# Patient Record
Sex: FEMALE | Race: WHITE
[De-identification: names, ages, dates, MRNs, and addresses within clinical notes are randomized per-mention and may not be internally consistent; named-entity substitution may affect disease eponyms.]

---

## 2017-07-18 ENCOUNTER — HOSPITAL ENCOUNTER (EMERGENCY)
Dept: HOSPITAL 17 - PHED | Age: 32
Discharge: HOME | End: 2017-07-18
Payer: COMMERCIAL

## 2017-07-18 VITALS — BODY MASS INDEX: 22.8 KG/M2 | WEIGHT: 123.9 LBS | HEIGHT: 62 IN

## 2017-07-18 VITALS
OXYGEN SATURATION: 100 % | DIASTOLIC BLOOD PRESSURE: 74 MMHG | RESPIRATION RATE: 16 BRPM | SYSTOLIC BLOOD PRESSURE: 120 MMHG | HEART RATE: 79 BPM

## 2017-07-18 VITALS
HEART RATE: 79 BPM | RESPIRATION RATE: 18 BRPM | DIASTOLIC BLOOD PRESSURE: 73 MMHG | SYSTOLIC BLOOD PRESSURE: 115 MMHG | OXYGEN SATURATION: 100 %

## 2017-07-18 VITALS
TEMPERATURE: 98.5 F | HEART RATE: 97 BPM | OXYGEN SATURATION: 100 % | DIASTOLIC BLOOD PRESSURE: 70 MMHG | RESPIRATION RATE: 18 BRPM | SYSTOLIC BLOOD PRESSURE: 124 MMHG

## 2017-07-18 DIAGNOSIS — O21.0: Primary | ICD-10-CM

## 2017-07-18 DIAGNOSIS — B96.89: ICD-10-CM

## 2017-07-18 DIAGNOSIS — Z3A.10: ICD-10-CM

## 2017-07-18 LAB
ALP SERPL-CCNC: 46 U/L (ref 45–117)
ALT SERPL-CCNC: 16 U/L (ref 10–53)
ANION GAP SERPL CALC-SCNC: 8 MEQ/L (ref 5–15)
AST SERPL-CCNC: 10 U/L (ref 15–37)
BACTERIA #/AREA URNS HPF: (no result) /HPF
BASOPHILS # BLD AUTO: 0 TH/MM3 (ref 0–0.2)
BASOPHILS NFR BLD: 0.5 % (ref 0–2)
BILIRUB SERPL-MCNC: 0.5 MG/DL (ref 0.2–1)
BUN SERPL-MCNC: 4 MG/DL (ref 7–18)
CHLORIDE SERPL-SCNC: 105 MEQ/L (ref 98–107)
COLOR UR: YELLOW
COMMENT (UR): (no result)
CULTURE IF INDICATED: (no result)
EOSINOPHIL # BLD: 0 TH/MM3 (ref 0–0.4)
EOSINOPHIL NFR BLD: 0.3 % (ref 0–4)
ERYTHROCYTE [DISTWIDTH] IN BLOOD BY AUTOMATED COUNT: 12 % (ref 11.6–17.2)
GFR SERPLBLD BASED ON 1.73 SQ M-ARVRAT: 173 ML/MIN (ref 89–?)
GLUCOSE UR STRIP-MCNC: (no result) MG/DL
HCO3 BLD-SCNC: 24.8 MEQ/L (ref 21–32)
HCT VFR BLD CALC: 38.6 % (ref 35–46)
HEMO FLAGS: (no result)
HGB UR QL STRIP: (no result)
KETONES UR STRIP-MCNC: (no result) MG/DL
LEUKOCYTE ESTERASE UR QL STRIP: (no result) /HPF (ref 0–5)
LYMPHOCYTES # BLD AUTO: 1.7 TH/MM3 (ref 1–4.8)
LYMPHOCYTES NFR BLD AUTO: 21.1 % (ref 9–44)
MCH RBC QN AUTO: 31.6 PG (ref 27–34)
MCHC RBC AUTO-ENTMCNC: 34.4 % (ref 32–36)
MCV RBC AUTO: 91.7 FL (ref 80–100)
MONOCYTES NFR BLD: 5.8 % (ref 0–8)
MUCOUS THREADS #/AREA URNS LPF: (no result) /LPF
NEUTROPHILS # BLD AUTO: 5.8 TH/MM3 (ref 1.8–7.7)
NEUTROPHILS NFR BLD AUTO: 72.3 % (ref 16–70)
NITRITE UR QL STRIP: (no result)
PLATELET # BLD: 306 TH/MM3 (ref 150–450)
POTASSIUM SERPL-SCNC: 3.9 MEQ/L (ref 3.5–5.1)
RBC # BLD AUTO: 4.21 MIL/MM3 (ref 4–5.3)
SODIUM SERPL-SCNC: 138 MEQ/L (ref 136–145)
SP GR UR STRIP: 1.02 (ref 1–1.03)
SQUAMOUS #/AREA URNS HPF: (no result) /HPF (ref 0–5)
WBC # BLD AUTO: 8 TH/MM3 (ref 4–11)

## 2017-07-18 PROCEDURE — 96365 THER/PROPH/DIAG IV INF INIT: CPT

## 2017-07-18 PROCEDURE — 80053 COMPREHEN METABOLIC PANEL: CPT

## 2017-07-18 PROCEDURE — 99284 EMERGENCY DEPT VISIT MOD MDM: CPT

## 2017-07-18 PROCEDURE — 81001 URINALYSIS AUTO W/SCOPE: CPT

## 2017-07-18 PROCEDURE — 87086 URINE CULTURE/COLONY COUNT: CPT

## 2017-07-18 PROCEDURE — 85025 COMPLETE CBC W/AUTO DIFF WBC: CPT

## 2017-07-18 PROCEDURE — 96361 HYDRATE IV INFUSION ADD-ON: CPT

## 2017-07-18 PROCEDURE — 96375 TX/PRO/DX INJ NEW DRUG ADDON: CPT

## 2017-07-18 NOTE — PD
HPI


Chief Complaint:  GI Complaint


Time Seen by Provider:  20:10


Travel History


International Travel<30 days:  No


Contact w/Intl Traveler<30days:  No


Traveled to known affect area:  No





History of Present Illness


HPI


Is a 32 year-old woman presents to the emergency department at about 10 weeks 

pregnant complains of nausea vomiting.  She states she's had nausea and 

vomiting for the past 2 weeks or so.  She hasn't really been able to eat or 

drink anything past several days.  This is her second pregnancy.  With her 

first pregnancy she had a lot of trouble with nausea and vomiting as well.  She 

was on Zofran and then.  She sees Dr. Little.  She wanted to hold off until 

the second trimester to use Zofran.  She denies any abdominal cramping bleeding 

or discharge.  She has had a little bit of sore throat and irritation, chest 

burning, and some epistaxis.





History


Past Medical History


Medical History:  Denies Significant Hx


Tetanus Vaccination:  > 5 Years


Influenza Vaccination:  No


LMP:  17


:  2


Para:  1





Past Surgical History


Surgical History:  No Previous Surgery





Social History


Alcohol Use:  No


Tobacco Use:  No





Allergies-Medications


(Allergen,Severity, Reaction):  


Coded Allergies:  


     Penicillin (Verified  Adverse Reaction, Mild, unknown, 17)


Uncoded Allergies:  


     CILLINS (Allergy, Severe, 10/1/15)


Reported Meds & Prescriptions





Reported Meds & Active Scripts


Active


No Active Prescriptions or Reported Medications    








Review of Systems


Except as stated in HPI:  all other systems reviewed are Neg





Physical Exam


Narrative


GENERAL: 32 year-old woman, generally well-appearing, no acute distress.


SKIN: Focused skin assessment warm/dry.


HEAD: Atraumatic. Normocephalic. 


CARDIOVASCULAR: Regular rate and rhythm.  No murmur appreciated.


RESPIRATORY: No accessory muscle use. Clear to auscultation. Breath sounds 

equal bilaterally. 


GASTROINTESTINAL: Abdomen soft, non-tender, nondistended. Hepatic and splenic 

margins not palpable. 


MUSCULOSKELETAL: No obvious deformities.  No edema.


NEUROLOGICAL: Awake and alert. No obvious cranial nerve deficits.  Motor 

grossly within normal limits. Normal speech.


PSYCHIATRIC: Appropriate mood and affect; insight and judgment normal.





Data


Data


Last Documented VS





Vital Signs








  Date Time  Temp Pulse Resp B/P Pulse Ox O2 Delivery O2 Flow Rate FiO2


 


17 21:34  79 16 120/74 100 Room Air  


 


17 19:51 98.5       








Orders





 Complete Blood Count With Diff (17 20:39)


Comprehensive Metabolic Panel (17 20:39)


Urinalysis - C+S If Indicated (17 20:39)


Iv Access Insert/Monitor (17 20:39)


Ed Poc Ultrasound (17 )


Sodium Chlor 0.9% 1000 Ml Inj (Ns 1000 M (17 20:45)


Sodium Chlor 0.9% 1000 Ml Inj (Ns 1000 M (17 20:45)


Thiamine Inj (Thiamine Inj) (17 20:45)


Ondansetron Inj (Zofran Inj) (17 20:45)


Urine Culture (17 21:25)





Labs








 Laboratory Tests








Test 17





 21:15 21:25


 


White Blood Count 8.0 TH/MM3 


 


Red Blood Count 4.21 MIL/MM3 


 


Hemoglobin 13.3 GM/DL 


 


Hematocrit 38.6 % 


 


Mean Corpuscular Volume 91.7 FL 


 


Mean Corpuscular Hemoglobin 31.6 PG 


 


Mean Corpuscular Hemoglobin 34.4 % 





Concent  


 


Red Cell Distribution Width 12.0 % 


 


Platelet Count 306 TH/MM3 


 


Mean Platelet Volume 8.6 FL 


 


Neutrophils (%) (Auto) 72.3 % 


 


Lymphocytes (%) (Auto) 21.1 % 


 


Monocytes (%) (Auto) 5.8 % 


 


Eosinophils (%) (Auto) 0.3 % 


 


Basophils (%) (Auto) 0.5 % 


 


Neutrophils # (Auto) 5.8 TH/MM3 


 


Lymphocytes # (Auto) 1.7 TH/MM3 


 


Monocytes # (Auto) 0.5 TH/MM3 


 


Eosinophils # (Auto) 0.0 TH/MM3 


 


Basophils # (Auto) 0.0 TH/MM3 


 


CBC Comment DIFF FINAL  


 


Differential Comment   


 


Sodium Level 138 MEQ/L 


 


Potassium Level 3.9 MEQ/L 


 


Chloride Level 105 MEQ/L 


 


Carbon Dioxide Level 24.8 MEQ/L 


 


Anion Gap 8 MEQ/L 


 


Blood Urea Nitrogen 4 MG/DL 


 


Creatinine 0.42 MG/DL 


 


Estimat Glomerular Filtration 173 ML/MIN 





Rate  


 


Random Glucose 80 MG/DL 


 


Calcium Level 8.5 MG/DL 


 


Total Bilirubin 0.5 MG/DL 


 


Aspartate Amino Transf 10 U/L 





(AST/SGOT)  


 


Alanine Aminotransferase 16 U/L 





(ALT/SGPT)  


 


Alkaline Phosphatase 46 U/L 


 


Total Protein 7.2 GM/DL 


 


Albumin 3.6 GM/DL 


 


Urine Color  YELLOW 


 


Urine Turbidity  SLIGHT 


 


Urine pH  6.0 


 


Urine Specific Gravity  1.018 


 


Urine Protein  NEG mg/dL


 


Urine Glucose (UA)  NEG mg/dL


 


Urine Ketones  80 OR GREATER





  mg/dL


 


Urine Occult Blood  NEG 


 


Urine Nitrite  NEG 


 


Urine Bilirubin  NEG 


 


Urine Leukocyte Esterase  NEG 


 


Urine WBC  0-2 /hpf


 


Urine Squamous Epithelial  0-5 /hpf





Cells  


 


Urine Bacteria  MOD /hpf


 


Urine Mucus  MOD /lpf


 


Urine Yeast (Budding)  RARE 


 


Microscopic Urinalysis Comment  CULTURE





  INDICATED














MDM


Medical Decision Making


Medical Screen Exam Complete:  Yes


Emergency Medical Condition:  Yes


Differential Diagnosis


Nausea vomiting of pregnancy, dehydration, vitamin deficiency, other


Narrative Course


Medical decision making





32-year-old with hyperemesis gravidarum.  She looks well.  We'll give IV fluid 

hydration, check labs, check urine, give Zofran, give thiamine.  Reassess.





Procedures


**Procedure Narrative**


Point of care ultrasound:


Focus transabdominal ultrasounds performed by me at the bedside for the purpose 

of evaluating for fetal well-being.  Abdi acute brace was identified, 

roughly consistent with dates, good fetal movement, good fetal heart rate.





Diagnosis





 Primary Impression:  


 Hyperemesis gravidarum


Patient Instructions:  General Instructions





***Additional Instructions:


Use Zofran as prescribed.





Take ranitidine as prescribed.





Follow up with Dr. Caban in the next 2-4 days.





Return to the ER for any worsening abdominal pain, vomiting, bleeding, or any 

other new or worsening symptoms.


***Med/Other Pt SpecificInfo:  Prescription(s) given


Scripts


Ranitidine 150 Mg Uvs198 Mg PO BID  #60 CAP


   Prov:Enrique Caldera MD         17 


Ondansetron Odt (Zofran Odt)4 Mg Tab4 Mg SL Q8HR PRN (Nausea/Vomiting) #15 TAB


   May substitute non-ODT form.


   Prov:Enrique Caldera MD         17


Disposition:  01 DISCHARGE HOME


Condition:  Stable








Enrique Caldera MD 2017 21:33

## 2017-07-25 ENCOUNTER — HOSPITAL ENCOUNTER (OUTPATIENT)
Dept: HOSPITAL 17 - H2EB | Age: 32
Setting detail: OBSERVATION
LOS: 1 days | Discharge: HOME | End: 2017-07-26
Attending: OBSTETRICS & GYNECOLOGY | Admitting: OBSTETRICS & GYNECOLOGY
Payer: COMMERCIAL

## 2017-07-25 VITALS — DIASTOLIC BLOOD PRESSURE: 53 MMHG | SYSTOLIC BLOOD PRESSURE: 99 MMHG | HEART RATE: 82 BPM

## 2017-07-25 VITALS — SYSTOLIC BLOOD PRESSURE: 104 MMHG | DIASTOLIC BLOOD PRESSURE: 56 MMHG | HEART RATE: 73 BPM

## 2017-07-25 VITALS — BODY MASS INDEX: 22.72 KG/M2 | HEIGHT: 62 IN | WEIGHT: 123.46 LBS

## 2017-07-25 VITALS — HEART RATE: 72 BPM

## 2017-07-25 VITALS — RESPIRATION RATE: 17 BRPM

## 2017-07-25 VITALS — HEART RATE: 80 BPM

## 2017-07-25 VITALS — HEART RATE: 76 BPM

## 2017-07-25 VITALS — RESPIRATION RATE: 16 BRPM

## 2017-07-25 VITALS — TEMPERATURE: 98.1 F

## 2017-07-25 DIAGNOSIS — O21.1: Primary | ICD-10-CM

## 2017-07-25 DIAGNOSIS — Z3A.11: ICD-10-CM

## 2017-07-25 DIAGNOSIS — R82.4: ICD-10-CM

## 2017-07-25 DIAGNOSIS — R63.4: ICD-10-CM

## 2017-07-25 DIAGNOSIS — R55: ICD-10-CM

## 2017-07-25 LAB
ALP SERPL-CCNC: 43 U/L (ref 45–117)
ALT SERPL-CCNC: 17 U/L (ref 10–53)
ANION GAP SERPL CALC-SCNC: 8 MEQ/L (ref 5–15)
AST SERPL-CCNC: 7 U/L (ref 15–37)
B-OH-BUTYR SERPL-SCNC: 0.2 MMOL/L (ref 0–0.39)
BASOPHILS # BLD AUTO: 0 TH/MM3 (ref 0–0.2)
BASOPHILS NFR BLD: 0.3 % (ref 0–2)
BILIRUB SERPL-MCNC: 0.5 MG/DL (ref 0.2–1)
BUN SERPL-MCNC: 5 MG/DL (ref 7–18)
CHLORIDE SERPL-SCNC: 103 MEQ/L (ref 98–107)
EOSINOPHIL # BLD: 0 TH/MM3 (ref 0–0.4)
EOSINOPHIL NFR BLD: 0 % (ref 0–4)
ERYTHROCYTE [DISTWIDTH] IN BLOOD BY AUTOMATED COUNT: 12.6 % (ref 11.6–17.2)
GFR SERPLBLD BASED ON 1.73 SQ M-ARVRAT: 148 ML/MIN (ref 89–?)
HCO3 BLD-SCNC: 24.9 MEQ/L (ref 21–32)
HCT VFR BLD CALC: 36.8 % (ref 35–46)
HEMO FLAGS: (no result)
LYMPHOCYTES # BLD AUTO: 1.1 TH/MM3 (ref 1–4.8)
LYMPHOCYTES NFR BLD AUTO: 13.1 % (ref 9–44)
MCH RBC QN AUTO: 32.1 PG (ref 27–34)
MCHC RBC AUTO-ENTMCNC: 35.6 % (ref 32–36)
MCV RBC AUTO: 90.1 FL (ref 80–100)
MONOCYTES NFR BLD: 4.2 % (ref 0–8)
NEUTROPHILS # BLD AUTO: 6.9 TH/MM3 (ref 1.8–7.7)
NEUTROPHILS NFR BLD AUTO: 82.4 % (ref 16–70)
PLATELET # BLD: 279 TH/MM3 (ref 150–450)
POTASSIUM SERPL-SCNC: 3.6 MEQ/L (ref 3.5–5.1)
RBC # BLD AUTO: 4.09 MIL/MM3 (ref 4–5.3)
RUBELLA STATUS: (no result)
RUBV IGG SER-ACNC: 25.2 IU/ML (ref 10–500)
SODIUM SERPL-SCNC: 136 MEQ/L (ref 136–145)
URATE SERPL-MCNC: 3.2 MG/DL (ref 2.6–6)
WBC # BLD AUTO: 8.4 TH/MM3 (ref 4–11)

## 2017-07-25 PROCEDURE — 82010 KETONE BODYS QUAN: CPT

## 2017-07-25 PROCEDURE — G0378 HOSPITAL OBSERVATION PER HR: HCPCS

## 2017-07-25 PROCEDURE — 84550 ASSAY OF BLOOD/URIC ACID: CPT

## 2017-07-25 PROCEDURE — 85025 COMPLETE CBC W/AUTO DIFF WBC: CPT

## 2017-07-25 PROCEDURE — 86901 BLOOD TYPING SEROLOGIC RH(D): CPT

## 2017-07-25 PROCEDURE — 80053 COMPREHEN METABOLIC PANEL: CPT

## 2017-07-25 PROCEDURE — 84439 ASSAY OF FREE THYROXINE: CPT

## 2017-07-25 PROCEDURE — 86376 MICROSOMAL ANTIBODY EACH: CPT

## 2017-07-25 PROCEDURE — 86762 RUBELLA ANTIBODY: CPT

## 2017-07-25 PROCEDURE — 86800 THYROGLOBULIN ANTIBODY: CPT

## 2017-07-25 PROCEDURE — 86900 BLOOD TYPING SEROLOGIC ABO: CPT

## 2017-07-25 PROCEDURE — 84481 FREE ASSAY (FT-3): CPT

## 2017-07-25 PROCEDURE — 86592 SYPHILIS TEST NON-TREP QUAL: CPT

## 2017-07-25 PROCEDURE — 84443 ASSAY THYROID STIM HORMONE: CPT

## 2017-07-25 PROCEDURE — 86703 HIV-1/HIV-2 1 RESULT ANTBDY: CPT

## 2017-07-25 PROCEDURE — 86850 RBC ANTIBODY SCREEN: CPT

## 2017-07-25 RX ADMIN — SODIUM CHLORIDE SCH MG: 900 INJECTION, SOLUTION INTRAVENOUS at 13:55

## 2017-07-25 RX ADMIN — OXYTOCIN SCH MLS/HR: 10 INJECTION, SOLUTION INTRAMUSCULAR; INTRAVENOUS at 13:55

## 2017-07-25 RX ADMIN — FAMOTIDINE SCH MG: 10 INJECTION, SOLUTION INTRAVENOUS at 15:29

## 2017-07-25 RX ADMIN — FAMOTIDINE SCH MG: 10 INJECTION, SOLUTION INTRAVENOUS at 20:31

## 2017-07-25 RX ADMIN — OXYTOCIN SCH MLS/HR: 10 INJECTION, SOLUTION INTRAMUSCULAR; INTRAVENOUS at 18:20

## 2017-07-25 NOTE — MH
cc:

NORMAN AGUILERA

****

 

DATE OF ADMISSION:  2017

 

ADMISSION DIAGNOSIS

Hyperemesis gravidarum with severe ketonuria,  10 pound weight loss and

multiple persistent episodes of syncope including the one in my office today.

She is currently at 11 weeks estimated gestational age.

 

HISTORY OF PRESENT ILLNESS

The patient is a very pleasant 32-year-old  white female  2, para

1-0-0-1 with LMP  and EDC  by initial  dates but  2018 by

6-week sonogram, is currently 11 and 07 weeks. This  is the second visit in the

office.  At the first visit at six weeks we deferred her exam due to severe

nausea and vomiting.  She was given diclegis and then  she was given Zofran. She

went to the emergency room two days ago at [place] [facility] for syncopal episodes,

dehydration. She was hydrated and given additional antiemetics and sent home.

She came to our office today  to do her new OB and literally passed out in the

office. She had severe ketones. She has lost 10 pounds since her visit 

and she cannot hold down any fluids or any solids. She had severe nausea with

her first child but not to this extent. That was a term delivery at 7 pounds 2

ounces  Other than this hyperemesis gravidarum, she has no chronic or

systemic illnesses.  She does not smoke or drink.  She is not using any alcohol

during pregnancy.  She has had no thyromegaly, hepatitis or other issues that

we are aware.

 

Family history is noncontributory.   She works at the [employer]

and her  works for [employer].

 

PHYSICAL EXAMINATION

GENERAL:  She is a very pale, thin white female with poor skin turgor and

tachycardia.

LUNGS: Clear.

HEART: Regular at 120.

We did hear good fetal heart tones.  She has poor capillary refill and

diminished pulses but normal reflexes.

 

IMPRESSION

Hyperemesis gravidarum in late first trimester in a multip who has had problems

with nausea and vomiting in the past but not to this extent.

 

PLAN

Admit for IV hydration and aggressive intervention to allow her to obtain

nutrition and do well.

 

 

                               __________________________________

                           Norman P. MD GABBIE Aguilera/SA

D:  2017/5:33 PM

T:  2017/6:13 PM

Visit #:  [id number]

Job #:  [id number]

[email or web address]

## 2017-07-25 NOTE — PD.OB.ANTE
Subjective


Diagnosis:  


(1) Hyperemesis gravidarum


Interval History


H & P dictated


33 yo mwf  at 11 weeks with 10 pounds weight loss in 3 weeks, severe 

ketonuria, Inability to tolerate any po and multiple syncopal episodes 

including in my office today.


Sent over for IV hydration and supportive care.





Objective


Vital Signs





Vital Signs








  Date Time  Temp Pulse Resp B/P Pulse Ox O2 Delivery O2 Flow Rate FiO2


 


17 17:56   17     


 


17 13:39   17     


 


17 13:37  73  104/56    


 


17 13:35  72      


 


17 13:30  76      








Lab & Micro Results











Test 17





 13:07 13:08


 


White Blood Count 8.4 TH/MM3 


 


Red Blood Count 4.09 MIL/MM3 


 


Hemoglobin 13.1 GM/DL 


 


Hematocrit 36.8 % 


 


Mean Corpuscular Volume 90.1 FL 


 


Mean Corpuscular Hemoglobin 32.1 PG 


 


Mean Corpuscular Hemoglobin 35.6 % 





Concent  


 


Red Cell Distribution Width 12.6 % 


 


Platelet Count 279 TH/MM3 


 


Mean Platelet Volume 8.8 FL 


 


Neutrophils (%) (Auto) 82.4 % 


 


Lymphocytes (%) (Auto) 13.1 % 


 


Monocytes (%) (Auto) 4.2 % 


 


Eosinophils (%) (Auto) 0.0 % 


 


Basophils (%) (Auto) 0.3 % 


 


Neutrophils # (Auto) 6.9 TH/MM3 


 


Lymphocytes # (Auto) 1.1 TH/MM3 


 


Monocytes # (Auto) 0.4 TH/MM3 


 


Eosinophils # (Auto) 0.0 TH/MM3 


 


Basophils # (Auto) 0.0 TH/MM3 


 


CBC Comment DIFF FINAL  


 


Differential Comment   


 


Sodium Level 136 MEQ/L 


 


Potassium Level 3.6 MEQ/L 


 


Chloride Level 103 MEQ/L 


 


Carbon Dioxide Level 24.9 MEQ/L 


 


Anion Gap 8 MEQ/L 


 


Blood Urea Nitrogen 5 MG/DL 


 


Creatinine 0.48 MG/DL 


 


Estimat Glomerular Filtration 148 ML/MIN 





Rate  


 


Random Glucose 78 MG/DL 


 


Uric Acid 3.2 MG/DL 


 


Calcium Level 8.9 MG/DL 


 


Total Bilirubin 0.5 MG/DL 


 


Aspartate Amino Transf 7 U/L 





(AST/SGOT)  


 


Alanine Aminotransferase 17 U/L 





(ALT/SGPT)  


 


Alkaline Phosphatase 43 U/L 


 


Total Protein 7.3 GM/DL 


 


Albumin 3.7 GM/DL 


 


Thyroid Stimulating Hormone 0.331 uIU/ML 





3rd Gen  


 


B-Hydroxybutyrate 0.20 MMOL/L 


 


Rubella Immunity Screen IMMUNE  


 


Rubella Antibody, Quantitative 25.2 IU/mL 


 


Blood Type  O POSITIVE 


 


Antibody Screen  NEGATIVE 








Physical Exam


GENERAL: Well-nourished, well-developed patient.  initially poor skin turgor.  


CARDIOVASCULAR: Regular rate and rhythm without murmurs, gallops, or rubs.  

tachycardia observed in office resolved


RESPIRATORY: Breath sounds equal bilaterally. No accessory muscle use.


ABDOMEN/GI: Abdomen soft, non-tender.


fetal heart tones heard in office. 





EXTREMITIES: No cyanosis or edema, non-tender, without signs of DVT.





Assessment and Plan


Assessment and Plan


hydrate


reglan, zantac and phenergan


advance diet as tolerated.








Jemima Little MD 2017 18:14

## 2017-07-26 VITALS — RESPIRATION RATE: 18 BRPM | HEART RATE: 76 BPM | SYSTOLIC BLOOD PRESSURE: 100 MMHG | DIASTOLIC BLOOD PRESSURE: 57 MMHG

## 2017-07-26 VITALS — DIASTOLIC BLOOD PRESSURE: 43 MMHG | HEART RATE: 75 BPM | SYSTOLIC BLOOD PRESSURE: 82 MMHG

## 2017-07-26 VITALS
RESPIRATION RATE: 18 BRPM | TEMPERATURE: 98.4 F | SYSTOLIC BLOOD PRESSURE: 101 MMHG | DIASTOLIC BLOOD PRESSURE: 58 MMHG | HEART RATE: 87 BPM

## 2017-07-26 VITALS — SYSTOLIC BLOOD PRESSURE: 85 MMHG | DIASTOLIC BLOOD PRESSURE: 54 MMHG | HEART RATE: 77 BPM

## 2017-07-26 VITALS — RESPIRATION RATE: 16 BRPM | DIASTOLIC BLOOD PRESSURE: 57 MMHG | HEART RATE: 78 BPM | SYSTOLIC BLOOD PRESSURE: 110 MMHG

## 2017-07-26 VITALS — DIASTOLIC BLOOD PRESSURE: 60 MMHG | SYSTOLIC BLOOD PRESSURE: 102 MMHG | HEART RATE: 76 BPM

## 2017-07-26 VITALS
SYSTOLIC BLOOD PRESSURE: 85 MMHG | RESPIRATION RATE: 16 BRPM | HEART RATE: 77 BPM | TEMPERATURE: 98.4 F | DIASTOLIC BLOOD PRESSURE: 54 MMHG

## 2017-07-26 VITALS — SYSTOLIC BLOOD PRESSURE: 105 MMHG | DIASTOLIC BLOOD PRESSURE: 59 MMHG | HEART RATE: 80 BPM

## 2017-07-26 VITALS — HEART RATE: 76 BPM | DIASTOLIC BLOOD PRESSURE: 50 MMHG | SYSTOLIC BLOOD PRESSURE: 81 MMHG

## 2017-07-26 VITALS — TEMPERATURE: 98.3 F

## 2017-07-26 VITALS — TEMPERATURE: 97.9 F

## 2017-07-26 LAB
RAPID PLASMA REAGIN SCREEN: (no result)
T3FREE SERPL-MCNC: 2.3 PG/ML (ref 2.18–3.98)
T4 FREE SERPL-MCNC: 1.2 NG/DL (ref 0.76–1.46)

## 2017-07-26 RX ADMIN — SODIUM CHLORIDE SCH MG: 900 INJECTION, SOLUTION INTRAVENOUS at 00:25

## 2017-07-26 RX ADMIN — SODIUM CHLORIDE SCH MG: 900 INJECTION, SOLUTION INTRAVENOUS at 06:00

## 2017-07-26 RX ADMIN — OXYTOCIN SCH MLS/HR: 10 INJECTION, SOLUTION INTRAMUSCULAR; INTRAVENOUS at 07:09

## 2017-07-26 RX ADMIN — OXYTOCIN SCH MLS/HR: 10 INJECTION, SOLUTION INTRAMUSCULAR; INTRAVENOUS at 00:26

## 2017-07-26 RX ADMIN — PROMETHAZINE HYDROCHLORIDE SCH MG: 25 TABLET ORAL at 14:17

## 2017-07-26 RX ADMIN — OXYTOCIN SCH MLS/HR: 10 INJECTION, SOLUTION INTRAMUSCULAR; INTRAVENOUS at 13:17

## 2017-07-26 RX ADMIN — PROMETHAZINE HYDROCHLORIDE SCH MG: 25 TABLET ORAL at 06:00

## 2017-07-26 RX ADMIN — OXYTOCIN SCH MLS/HR: 10 INJECTION, SOLUTION INTRAMUSCULAR; INTRAVENOUS at 06:30

## 2017-07-26 RX ADMIN — FAMOTIDINE SCH MG: 10 INJECTION, SOLUTION INTRAVENOUS at 09:00

## 2017-07-26 RX ADMIN — PROMETHAZINE HYDROCHLORIDE SCH MG: 25 TABLET ORAL at 00:25

## 2017-07-26 NOTE — HHI.DS
Discharge Summary


Admission Date


Jul 25, 2017 at 11:56


Discharge Date:  Jul 26, 2017


Admitting Diagnosis


Hyperemesis gravidarum, [place] pregnancy at 11 weeks





(1) Hyperemesis gravidarum


Diagnosis:  Principal





Procedures


monitoring on labor and delivery, IV fluids and IV medication


Brief History


33 yo at 11 weeks gestation, pt of Dr. Faye admitted after fainting in 

office due to history of severe nausea/vomiting in first trimester, hyperemesis 

gravidarum diagnosed.  Pt was poorly controlled on oral Zofran.


CBC/BMP:  


7/25/17 1307                                                                   

             7/25/17 1307





Significant Findings





Laboratory Tests








Test 7/25/17





 13:07


 


Neutrophils (%) (Auto) 82.4 %





 (16.0-70.0)


 


Blood Urea Nitrogen 5 MG/DL (7-18)


 


Creatinine 0.48 MG/DL





 (0.50-1.00)


 


Aspartate Amino Transf 7 U/L (15-37)





(AST/SGOT) 


 


Alkaline Phosphatase 43 U/L ()


 


Thyroid Stimulating Hormone 0.331 uIU/ML





3rd Gen (0.358-3.740)








PE at Discharge


NAD A&Ox3


CTA bl no wheeze


RRR 


Abd soft nontender nondistended


uterus beneath symphysis c/w dates


no c/c/e x 4


+FHTs 170s


Hospital Course


Pt was admitted 7/25/17 for IV hydration and IV antiemetics to control severe 

hyperemesis symptoms including syncope in office.  Pt did well after 

medications and IV fluids, was transitioned to po anti-emetics and reflux 

medications, tolerated breakfast and lunch and was able to ambulate without any 

dizziness or fatigue.  Discharged to home with office f/u in 1-2 weeks w Dr. Little. Screening TSH had been abnormal but Free T4 was wnl.


Pt Condition on Discharge:  Good


Discharge Disposition:  Discharge Home


Discharge Instructions


DIET: Follow Instructions for:  Pregnancy Diet


Activities you can perform:  See Additionl Instruction (pregnancy precautions)








Nory Posadas MD Jul 26, 2017 13:35

## 2017-07-26 NOTE — PD.OB.ANTE
Subjective


Diagnosis:  


(1) Hyperemesis gravidarum


Diagnosis:  Principal





Interval History


Pt states overnight she has improved as far as nausea symptoms, tolerated 

clears and wants to try regular diet for breakfast.  Denies dizziness, fatigue, 

shortness of breath, VB, LOF, or contractions.  Pain 0/10.


Antepartum ROS:  Reports: Fetal movement normal (too early for fetal movement), 


    Denies: New complaints, Loss of fluid, Vaginal bleeding, Contractions, Other





Objective


Vital Signs





Vital Signs








  Date Time  Temp Pulse Resp B/P Pulse Ox O2 Delivery O2 Flow Rate FiO2


 


7/26/17 08:13  76  102/60    


 


7/26/17 08:12   18     


 


7/26/17 08:12  76  100/57    


 


7/26/17 08:10 98.3       


 


7/26/17 03:39  78  110/57    


 


7/26/17 03:39   16     


 


7/26/17 03:38 97.9       


 


7/26/17 00:35  80  105/59    


 


7/26/17 00:07  77  85/54    


 


7/26/17 00:06  75  82/43    


 


7/26/17 00:05  76  81/50    


 


7/26/17 00:00 98.4       


 


7/26/17 00:00  77 16 85/54    


 


7/25/17 20:45  80      


 


7/25/17 20:30   16     


 


7/25/17 20:25  82      


 


7/25/17 20:25    99/53    


 


7/25/17 20:24 98.1       


 


7/25/17 17:56   17     


 


7/25/17 13:39   17     


 


7/25/17 13:37  73  104/56    


 


7/25/17 13:35  72      


 


7/25/17 13:30  76      








Lab & Micro Results











Test 7/25/17 7/25/17





 13:07 13:08


 


White Blood Count 8.4 TH/MM3 


 


Red Blood Count 4.09 MIL/MM3 


 


Hemoglobin 13.1 GM/DL 


 


Hematocrit 36.8 % 


 


Mean Corpuscular Volume 90.1 FL 


 


Mean Corpuscular Hemoglobin 32.1 PG 


 


Mean Corpuscular Hemoglobin 35.6 % 





Concent  


 


Red Cell Distribution Width 12.6 % 


 


Platelet Count 279 TH/MM3 


 


Mean Platelet Volume 8.8 FL 


 


Neutrophils (%) (Auto) 82.4 % 


 


Lymphocytes (%) (Auto) 13.1 % 


 


Monocytes (%) (Auto) 4.2 % 


 


Eosinophils (%) (Auto) 0.0 % 


 


Basophils (%) (Auto) 0.3 % 


 


Neutrophils # (Auto) 6.9 TH/MM3 


 


Lymphocytes # (Auto) 1.1 TH/MM3 


 


Monocytes # (Auto) 0.4 TH/MM3 


 


Eosinophils # (Auto) 0.0 TH/MM3 


 


Basophils # (Auto) 0.0 TH/MM3 


 


CBC Comment DIFF FINAL  


 


Differential Comment   


 


Sodium Level 136 MEQ/L 


 


Potassium Level 3.6 MEQ/L 


 


Chloride Level 103 MEQ/L 


 


Carbon Dioxide Level 24.9 MEQ/L 


 


Anion Gap 8 MEQ/L 


 


Blood Urea Nitrogen 5 MG/DL 


 


Creatinine 0.48 MG/DL 


 


Estimat Glomerular Filtration 148 ML/MIN 





Rate  


 


Random Glucose 78 MG/DL 


 


Uric Acid 3.2 MG/DL 


 


Calcium Level 8.9 MG/DL 


 


Total Bilirubin 0.5 MG/DL 


 


Aspartate Amino Transf 7 U/L 





(AST/SGOT)  


 


Alanine Aminotransferase 17 U/L 





(ALT/SGPT)  


 


Alkaline Phosphatase 43 U/L 


 


Total Protein 7.3 GM/DL 


 


Albumin 3.7 GM/DL 


 


Thyroid Stimulating Hormone 0.331 uIU/ML 





3rd Gen  


 


B-Hydroxybutyrate 0.20 MMOL/L 


 


Rubella Immunity Screen IMMUNE  


 


Rubella Antibody, Quantitative 25.2 IU/mL 


 


Blood Type  O POSITIVE 


 


Antibody Screen  NEGATIVE 








Physical Exam


GENERAL: Well-nourished, well-developed patient.  Sitting up in bed. 


CARDIOVASCULAR: Regular rate and rhythm without murmurs, gallops, or rubs.


RESPIRATORY: Breath sounds equal bilaterally. No accessory muscle use.


ABDOMEN/GI: Abdomen soft, non-tender.


  Fundus: beneath symphysis c/w dates


GENITOURINARY:


External Genitalia: deferred


FHT's:


  +FCA 170s this AM


EXTREMITIES: No cyanosis or edema, non-tender, without signs of DVT.





Assessment and Plan


Problem List:  


(1) Hyperemesis gravidarum


Status:  Acute


Assessment and Plan


Hyperemesis gravidarum - controlled with combination of Pepcid, Phenergan, 

Reglan IV overnight; will transition to po and see if tolerates lunch today; 

TSH low but need confirmatory Free T4, ordered this AM, will f/u and see if 

needs treatment





dispo: not yet meeting discharge criteria, hopefully later today








Nory Posadas MD Jul 26, 2017 09:20

## 2017-07-28 LAB — THYROGLOB AB SERPL-ACNC: (no result) IU/ML

## 2017-12-26 ENCOUNTER — HOSPITAL ENCOUNTER (OUTPATIENT)
Dept: HOSPITAL 17 - HOBED | Age: 32
Setting detail: OBSERVATION
LOS: 4 days | Discharge: HOME | End: 2017-12-30
Attending: OBSTETRICS & GYNECOLOGY | Admitting: OBSTETRICS & GYNECOLOGY
Payer: COMMERCIAL

## 2017-12-26 VITALS — HEART RATE: 100 BPM | RESPIRATION RATE: 18 BRPM

## 2017-12-26 VITALS — HEART RATE: 108 BPM

## 2017-12-26 VITALS — HEART RATE: 105 BPM

## 2017-12-26 VITALS — HEART RATE: 102 BPM

## 2017-12-26 VITALS — HEART RATE: 101 BPM | DIASTOLIC BLOOD PRESSURE: 67 MMHG | SYSTOLIC BLOOD PRESSURE: 107 MMHG

## 2017-12-26 VITALS — RESPIRATION RATE: 18 BRPM | TEMPERATURE: 99.5 F

## 2017-12-26 VITALS — HEART RATE: 107 BPM

## 2017-12-26 VITALS — HEART RATE: 104 BPM

## 2017-12-26 VITALS — HEART RATE: 103 BPM

## 2017-12-26 VITALS — HEART RATE: 106 BPM

## 2017-12-26 VITALS — HEART RATE: 101 BPM

## 2017-12-26 VITALS — DIASTOLIC BLOOD PRESSURE: 55 MMHG | HEART RATE: 117 BPM | SYSTOLIC BLOOD PRESSURE: 109 MMHG

## 2017-12-26 VITALS — HEART RATE: 110 BPM

## 2017-12-26 VITALS
HEART RATE: 98 BPM | RESPIRATION RATE: 18 BRPM | DIASTOLIC BLOOD PRESSURE: 58 MMHG | SYSTOLIC BLOOD PRESSURE: 103 MMHG | TEMPERATURE: 98.5 F

## 2017-12-26 VITALS — WEIGHT: 152.12 LBS | BODY MASS INDEX: 27.99 KG/M2 | HEIGHT: 62 IN

## 2017-12-26 VITALS — HEART RATE: 97 BPM

## 2017-12-26 VITALS — TEMPERATURE: 99.3 F

## 2017-12-26 VITALS — TEMPERATURE: 98.5 F

## 2017-12-26 VITALS — HEART RATE: 109 BPM

## 2017-12-26 DIAGNOSIS — Z3A.33: ICD-10-CM

## 2017-12-26 DIAGNOSIS — N12: Primary | ICD-10-CM

## 2017-12-26 DIAGNOSIS — O21.0: ICD-10-CM

## 2017-12-26 DIAGNOSIS — B96.20: ICD-10-CM

## 2017-12-26 LAB
ALBUMIN SERPL-MCNC: 2.5 GM/DL (ref 3.4–5)
ALP SERPL-CCNC: 95 U/L (ref 45–117)
ALT SERPL-CCNC: 11 U/L (ref 10–53)
AST SERPL-CCNC: 12 U/L (ref 15–37)
BACTERIA #/AREA URNS HPF: (no result) /HPF
BASOPHILS # BLD AUTO: 0 TH/MM3 (ref 0–0.2)
BASOPHILS NFR BLD: 0.2 % (ref 0–2)
BILIRUB INDIRECT SERPL-MCNC: 0.6 MG/DL (ref 0–0.8)
BILIRUB SERPL-MCNC: 0.7 MG/DL (ref 0.2–1)
BUN SERPL-MCNC: 6 MG/DL (ref 7–18)
CALCIUM SERPL-MCNC: 8.5 MG/DL (ref 8.5–10.1)
CHLORIDE SERPL-SCNC: 104 MEQ/L (ref 98–107)
COLOR UR: YELLOW
CREAT SERPL-MCNC: 0.41 MG/DL (ref 0.5–1)
DIRECT BILIRUBIN ADULT: 0.1 MG/DL (ref 0–0.2)
EOSINOPHIL # BLD: 0 TH/MM3 (ref 0–0.4)
EOSINOPHIL NFR BLD: 0 % (ref 0–4)
ERYTHROCYTE [DISTWIDTH] IN BLOOD BY AUTOMATED COUNT: 12.9 % (ref 11.6–17.2)
GFR SERPLBLD BASED ON 1.73 SQ M-ARVRAT: 178 ML/MIN (ref 89–?)
GLUCOSE SERPL-MCNC: 90 MG/DL (ref 74–106)
GLUCOSE UR STRIP-MCNC: (no result) MG/DL
HCO3 BLD-SCNC: 20.1 MEQ/L (ref 21–32)
HCT VFR BLD CALC: 31.9 % (ref 35–46)
HGB BLD-MCNC: 10.7 GM/DL (ref 11.6–15.3)
HGB UR QL STRIP: (no result)
KETONES UR STRIP-MCNC: 150 MG/DL
LYMPHOCYTES # BLD AUTO: 0.8 TH/MM3 (ref 1–4.8)
LYMPHOCYTES NFR BLD AUTO: 5.8 % (ref 9–44)
MCH RBC QN AUTO: 30.4 PG (ref 27–34)
MCHC RBC AUTO-ENTMCNC: 33.6 % (ref 32–36)
MCV RBC AUTO: 90.5 FL (ref 80–100)
MONOCYTE #: 0.8 TH/MM3 (ref 0–0.9)
MONOCYTES NFR BLD: 6.1 % (ref 0–8)
MUCOUS THREADS #/AREA URNS LPF: (no result) /LPF
NEUTROPHILS # BLD AUTO: 11.4 TH/MM3 (ref 1.8–7.7)
NEUTROPHILS NFR BLD AUTO: 87.9 % (ref 16–70)
NITRITE UR QL STRIP: (no result)
PLATELET # BLD: 266 TH/MM3 (ref 150–450)
PMV BLD AUTO: 8.3 FL (ref 7–11)
PROT SERPL-MCNC: 6.8 GM/DL (ref 6.4–8.2)
RBC # BLD AUTO: 3.52 MIL/MM3 (ref 4–5.3)
SODIUM SERPL-SCNC: 134 MEQ/L (ref 136–145)
SP GR UR STRIP: 1.02 (ref 1–1.03)
SQUAMOUS #/AREA URNS HPF: 10 /HPF (ref 0–5)
URINE LEUKOCYTE ESTERASE: (no result)
WBC # BLD AUTO: 13 TH/MM3 (ref 4–11)
WHITE BLOOD CELL CLUMPS: (no result)

## 2017-12-26 PROCEDURE — 59025 FETAL NON-STRESS TEST: CPT

## 2017-12-26 PROCEDURE — 87086 URINE CULTURE/COLONY COUNT: CPT

## 2017-12-26 PROCEDURE — 80076 HEPATIC FUNCTION PANEL: CPT

## 2017-12-26 PROCEDURE — 99285 EMERGENCY DEPT VISIT HI MDM: CPT

## 2017-12-26 PROCEDURE — 87186 SC STD MICRODIL/AGAR DIL: CPT

## 2017-12-26 PROCEDURE — 80048 BASIC METABOLIC PNL TOTAL CA: CPT

## 2017-12-26 PROCEDURE — 85025 COMPLETE CBC W/AUTO DIFF WBC: CPT

## 2017-12-26 PROCEDURE — 87077 CULTURE AEROBIC IDENTIFY: CPT

## 2017-12-26 PROCEDURE — 87040 BLOOD CULTURE FOR BACTERIA: CPT

## 2017-12-26 PROCEDURE — 76775 US EXAM ABDO BACK WALL LIM: CPT

## 2017-12-26 PROCEDURE — 96366 THER/PROPH/DIAG IV INF ADDON: CPT

## 2017-12-26 PROCEDURE — G0378 HOSPITAL OBSERVATION PER HR: HCPCS

## 2017-12-26 PROCEDURE — 96368 THER/DIAG CONCURRENT INF: CPT

## 2017-12-26 PROCEDURE — 81001 URINALYSIS AUTO W/SCOPE: CPT

## 2017-12-26 PROCEDURE — 96365 THER/PROPH/DIAG IV INF INIT: CPT

## 2017-12-26 RX ADMIN — Medication SCH ML: at 21:00

## 2017-12-26 RX ADMIN — OXYTOCIN SCH MLS/HR: 10 INJECTION, SOLUTION INTRAMUSCULAR; INTRAVENOUS at 14:33

## 2017-12-26 RX ADMIN — CLINDAMYCIN PHOSPHATE SCH MLS/HR: 150 INJECTION, SOLUTION INTRAMUSCULAR; INTRAVENOUS at 14:39

## 2017-12-26 RX ADMIN — CLINDAMYCIN PHOSPHATE SCH MLS/HR: 150 INJECTION, SOLUTION INTRAMUSCULAR; INTRAVENOUS at 22:06

## 2017-12-26 RX ADMIN — CEFTRIAXONE SODIUM SCH MLS/HR: 2 INJECTION, POWDER, FOR SOLUTION INTRAMUSCULAR; INTRAVENOUS at 14:11

## 2017-12-26 NOTE — PD
HPI


Chief Complaint


UTI symptoms, back pain


Date Seen:  Dec 26, 2017


Time Seen:  12:49


Travel History


International Travel<30 Days:  No


Contact w/Intl Traveler<30Days:  No





History of Present Illness


HPI


Patient is a 32 year old  at 33 and 0/7 weeks gestation, OB care with Dr. Little. She presents to the OB ED with continued low back pain and urinary 

frequency. She states she just finished a seven-day course of Macrobid for a UTI

, last dose . She states her symptoms resolved for a few days but recurred 

in the last day or so characterized by increased urinary frequency and severe 

right lower back pain. No dysuria. She denies leakage of fluid, vaginal bleeding

, and contractions. She feels baby moving regularly. She denies HA/N/V/D/fever/

sick contacts/SOB/calf pain/dizziness/seeing spots. She is noted to have 

penicillin allergy, causing rash in the past.





History


Past Medical History


Medical History:  Denies Significant Hx





Obstetric History


Obstetric History


: Full-term vaginal delivery female infant, hyperemesis gravidarum


G2: Current, hyperemesis gravidarum, UTI diagnosed approximately 17, 

treated with Macrobid 7 days 100 mg twice a day


Denies other complications this pregnancy





Past Surgical History


Surgical History:  No Previous Surgery





Family History


Family History:  Negative





Social History


Alcohol Use:  No


Tobacco Use:  No


Substance Abuse:  No





Allergies-Medications


(Allergen,Severity, Reaction):  


Coded Allergies:  


     penicillin G (Unverified  Adverse Reaction, Severe, rash, 8/15/17)


Uncoded Allergies:  


     CILLINS (Allergy, Severe, 10/1/15)


Home Meds


Active Scripts


Metoclopramide (Reglan) 10 Mg Tab, 10 MG PO TIDAC for Reflux, #60 TAB 2 Refills


   Prov:Nory Posadas MD         17


Ranitidine (Zantac) 150 Mg Tab, 150 MG PO BID for Reduce Stomach Acid, #60 TAB 

2 Refills


   Prov:Nory Posadas MD         17


Promethazine (Phenergan) 25 Mg Tablet, 25 MG PO Q8H for Nausea, #90 TAB 2 

Refills


   Prov:Nory Posadas MD         17


Ondansetron Odt (Zofran Odt) 4 Mg Tab, 4 MG SL Q8HR Y for Nausea/Vomiting, #15 

TAB


   May substitute non-ODT form.


   Prov:Enrique Caldera MD         17





Review of Systems


Except as stated in HPI:  all other systems reviewed are Neg





Physical Exam





Vital Signs








  Date Time  Temp Pulse Resp B/P (MAP) Pulse Ox O2 Delivery O2 Flow Rate FiO2


 


17 12:40  104      


 


17 12:35  108      








Narrative


GENERAL: Well-nourished, well-developed female in no apparent distress.


SKIN: Warm and dry. No rashes or ecchymoses.


HEAD: Normocephalic and atraumatic.


EYES: No scleral icterus. No injection or drainage. 


ENT: No nasal drainage noted. Mucous membranes pink. Airway patent.


CARDIOVASCULAR: Regular rate and rhythm without murmurs, gallops, or rubs. 


RESPIRATORY: Breath sounds equal bilaterally. No accessory muscle use.


BACK: Positive CVA tenderness on the right


ABDOMEN/GI: Abdomen soft, non-tender, bowel sounds present, no rebound, no 

guarding. Gravid uterus.


GENITOURINARY: deferred


   External Genitalia: intact and normal in appearance


   Uterine Contractions: some irritability


   Cervix: close, thick, high


FHT's: 


   Category: 1


   Baseline: 150s  


   Reactive: 160s


   Variability: mod 


   Decels: absent


EXTREMITIES: No cyanosis or edema.


BACK: Nontender without obvious deformity. No CVA tenderness.


NEUROLOGICAL: Awake and alert. Motor and sensory grossly within normal limits. 

Five out of 5 muscle strength in all muscle groups. Normal speech.





Data


Data


Vital Signs Reviewed:  Yes (Temp 98.6, /70)


Orders





 Orders


Vital Signs (Adult) .ON ADMISSION (17 12:35)


^ Labor Status (17 12:35)


Urinalysis - C+S If Indicated (17 12:35)


^ Non Stress Test (17 12:35)


Labs





Laboratory Tests








Test


  17


12:30











MDM


Medical Record Reviewed:  Yes


Narrative Course / MDM


Patient is a 32 year old  at 33 and 0/7 weeks gestation, OB care with Dr. Little presenting with possible pyelonephritis. If workup positive, will 

admit to OBS for IV antibiotics and consult Dr. Little.





Pyelonephritis:


Suspected based on history and presenting symptoms


Afebrile


UA ordered


By mouth hydration for now, given history of hyperemesis gravidarum we'll 

transition IV fluids if needed





Intrauterine Pregnancy:


Category 1 tracing


No contractions on monitor to suggest labor


Routine prenatal care


 


Hyperemesis gravidarum:


Takes Zofran when necessary, last dose this morning


IV fluids as necessary


Zofran IV as necessary





WDW Olivia Carr MD R2 Dec 26, 2017 13:04

## 2017-12-26 NOTE — HHI.HP
HPI


Travel History


International Travel<30 Days:  No


Contact w/Intl Traveler<30Days:  No





History of Present Illness


HPI


Patient is a 32 year old  at 33 and 0/7 weeks gestation, OB care with Dr. Little. She presents to the OB ED with continued low back pain and urinary 

frequency. She states she just finished a seven-day course of Macrobid for a UTI

, last dose . She states her symptoms resolved for a few days but recurred 

in the last day or so characterized by increased urinary frequency and severe 

right lower back pain. No dysuria. She denies leakage of fluid, vaginal bleeding

, and contractions. She feels baby moving regularly. She denies HA/N/V/D/fever/

sick contacts/SOB/calf pain/dizziness/seeing spots. She is noted to have 

penicillin allergy, causing rash in the past.





History


Past Medical History


Medical History:  Denies Significant Hx





Obstetric History


: Full-term vaginal delivery female infant, hyperemesis gravidarum


G2: Current, hyperemesis gravidarum, UTI diagnosed approximately 17, 

treated with Macrobid 7 days 100 mg twice a day


Denies other complications this pregnancy





Past Surgical History


Surgical History:  No Previous Surgery





Family History


Family History:  Negative





Social History


Alcohol Use:  No


Tobacco Use:  No


Substance Abuse:  No





Allergies-Medications


(Allergen,Severity, Reaction):  


Coded Allergies:  


     penicillin G (Unverified  Adverse Reaction, Severe, rash, 8/15/17)


Uncoded Allergies:  


     CILLINS (Allergy, Severe, 10/1/15)


Home Meds


Active Scripts


Metoclopramide (Reglan) 10 Mg Tab, 10 MG PO TIDAC for Reflux, #60 TAB 2 Refills


   Prov:Nory Posadas MD         17


Ranitidine (Zantac) 150 Mg Tab, 150 MG PO BID for Reduce Stomach Acid, #60 TAB 

2 Refills


   Prov:Nory Posadas MD         17


Promethazine (Phenergan) 25 Mg Tablet, 25 MG PO Q8H for Nausea, #90 TAB 2 

Refills


   Prov:Nory Posadas MD         17


Ondansetron Odt (Zofran Odt) 4 Mg Tab, 4 MG SL Q8HR Y for Nausea/Vomiting, #15 

TAB


   May substitute non-ODT form.


   Prov:Enrique Caldera MD         17





Review of Systems


Except as stated in HPI:  all other systems reviewed are Neg





Physical Exam





Vital Signs








  Date Time  Temp Pulse Resp B/P (MAP) Pulse Ox O2 Delivery O2 Flow Rate FiO2


 


17 12:40  104      


 


17 12:35  108      








Narrative


GENERAL: Well-nourished, well-developed female in no apparent distress.


SKIN: Warm and dry. No rashes or ecchymoses.


HEAD: Normocephalic and atraumatic.


EYES: No scleral icterus. No injection or drainage. 


ENT: No nasal drainage noted. Mucous membranes pink. Airway patent.


CARDIOVASCULAR: Regular rate and rhythm without murmurs, gallops, or rubs. 


RESPIRATORY: Breath sounds equal bilaterally. No accessory muscle use.


BACK: Positive CVA tenderness on the right


ABDOMEN/GI: Abdomen soft, non-tender, bowel sounds present, no rebound, no 

guarding. Gravid uterus.


GENITOURINARY: deferred


   External Genitalia: intact and normal in appearance


   Uterine Contractions: some irritability


   Cervix: close, thick, high


FHT's: 


   Category: 1


   Baseline: 150s  


   Reactive: 160s


   Variability: mod 


   Decels: absent


EXTREMITIES: No cyanosis or edema.


BACK: Nontender without obvious deformity. No CVA tenderness.


NEUROLOGICAL: Awake and alert. Motor and sensory grossly within normal limits. 

Five out of 5 muscle strength in all muscle groups. Normal speech.





Caprini VTE Risk Assessment


Caprini VTE Risk Assessment:  Mod/High Risk (score >= 2)


Caprini Risk Assessment Model











 Point Value = 1          Point Value = 2  Point Value = 3  Point Value = 5


 


Age 41-60


Minor surgery


BMI > 25 kg/m2


Swollen legs


Varicose veins


Pregnancy or postpartum


History of unexplained or recurrent


   spontaneous 


Oral contraceptives or hormone


   replacement


Sepsis (< 1 month)


Serious lung disease, including


   pneumonia (< 1 month)


Abnormal pulmonary function


Acute myocardial infarction


Congestive heart failure (< 1 month)


History of inflammatory bowel disease


Medical patient at bed rest Age 61-74


Arthroscopic surgery


Major open surgery (> 45 min)


Laparoscopic surgery (> 45 min)


Malignancy


Confined to bed (> 72 hours)


Immobilizing plaster cast


Central venous access Age >= 75


History of VTE


Family history of VTE


Factor V Leiden


Prothrombin 12699Z


Lupus anticoagulant


Anticardiolipin antibodies


Elevated serum homocysteine


Heparin-induced thrombocytopenia


Other congenital or acquired


   thrombophilia Stroke (< 1 month)


Elective arthroplasty


Hip, pelvis, or leg fracture


Acute spinal cord injury (< 1 month)








Prophylaxis Regimen











   Total Risk


Factor Score Risk Level Prophylaxis Regimen


 


0-1      Low Early ambulation


 


2 Moderate Order ONE of the following:


*Sequential Compression Device (SCD)


*Heparin 5000 units SQ BID


 


3-4 Higher Order ONE of the following medications:


*Heparin 5000 units SQ TID


*Enoxaparin/Lovenox 40 mg SQ daily (WT < 150 kg, CrCl > 30 mL/min)


*Enoxaparin/Lovenox 30 mg SQ daily (WT < 150 kg, CrCl > 10-29 mL/min)


*Enoxaparin/Lovenox 30 mg SQ BID (WT < 150 kg, CrCl > 30 mL/min)


AND/OR


*Sequential Compression Device (SCD)


 


5 or more Highest Order ONE of the following medications:


*Heparin 5000 units SQ TID (Preferred with Epidurals)


*Enoxaparin/Lovenox 40 mg SQ daily (WT < 150 kg, CrCl > 30 mL/min)


*Enoxaparin/Lovenox 30 mg SQ daily (WT < 150 kg, CrCl > 10-29 mL/min)


*Enoxaparin/Lovenox 30 mg SQ BID (WT < 150 kg, CrCl > 30 mL/min)


AND


*Sequential Compression Device (SCD)











Data


Data


Vital Signs Reviewed:  Yes (wnl, afebrile)


Orders





 Orders


Vital Signs (Adult) .ON ADMISSION (17 12:35)


^ Labor Status (17 12:35)


Urinalysis - C+S If Indicated (17 12:35)


^ Non Stress Test (17 12:35)


Urine Culture (17 12:30)


Place In Observation (17 )


Diet Regular Basic (17 Lunch)


Vital Signs (Adult) [id number] AWAKE (17 13:31)


Fetal Heart (17 13:31)


Activity Oob Ad Amina (17 13:31)


Complete Blood Count With Diff (17 13:31)


Basic Metabolic Panel (Bmp) (17 13:31)


Hepatic Functional Panel (17 13:31)


Sodium Chloride 0.9% Flush (Ns Flush) (17 21:00)


Sodium Chloride 0.9% Flush (Ns Flush) (17 13:45)


Ondansetron Inj (Zofran Inj) (17 13:45)


Ob (2e) Additional Admit Info (17 13:36)


Labs





Laboratory Tests








Test


  17


12:30


 


Urine Color YELLOW 


 


Urine Turbidity CLOUDY 


 


Urine pH 6.0 


 


Urine Specific Gravity 1.018 


 


Urine Protein 100 


 


Urine Glucose (UA) NEG 


 


Urine Ketones 150 


 


Urine Occult Blood SMALL 


 


Urine Nitrite POS 


 


Urine Bilirubin NEG 


 


Urine Urobilinogen LESS THAN 2.0 


 


Urine Leukocyte Esterase LARGE 


 


Urine RBC 28 


 


Urine WBC  


 


Urine WBC Clumps MOD 


 


Urine Squamous Epithelial


Cells 10 


 


 


Urine Bacteria MANY 


 


Urine Mucus FEW 


 


Microscopic Urinalysis Comment


  CULTURE


INDICATED














 Date/Time


Source Procedure


Growth Status


 


 


 17 12:30


Urine Clean Catch Urine Culture


Pending Received











Assessment/Plan


Assessment and Plan


Patient is a 32 year old  at 33 and 0/7 weeks gestation, OB care with Dr. Little presenting with possible pyelonephritis. 





Pyelonephritis:


Suspected based on history and presenting symptoms


UTI treated with Macrobid 7-day course 12/15- with continued symptoms


Right CVA tenderness


UA ordered and noted to have positive nitrites, large leukocyte esterase, 

innumerable WBC, 100 protein-->culture pending


IV LR bolus 1 liter, then at rate of 125ml/hr


Antibiotic course per consultation with Dr. Little: Rocephin 2g q24hr IV, 

Gentamicin 80mg q8hr IV


Patient counseled on likely 48 hr stay


Will treat contractions with terbutaline if non-resolving with fluids





Intrauterine Pregnancy:


Category 1 fetal tracing --> will continue intermittent fetal monitoring at 

least q shift


Contractions noted intermittently on monitor, will treat with IVF and monitor


Cervix closed, thick, high


Routine prenatal care


 


Hyperemesis gravidarum:


Takes Zofran PRN at home necessary, last dose this morning


IVF as above


Zofran 4mg q6hr IV PRN





WDW Olivia Carr MD R2 Dec 26, 2017 13:50

## 2017-12-26 NOTE — PD.OB.ANTE
Subjective


Diagnosis:  


(1) Pyelonephritis affecting pregnancy in third trimester


Interval History


33 yo mwf at 33 weeks EGA now on gent and rocephin for severe RCVAT and dirty 

urine that is presumed to be pyelonephritis.  Culture pending.  Had been on 

Macrobid through the office.  No prior hx of UTIs.  Had severe hyperemesis in 

first trimester.  Otherwise healthy woman with normal pregnancy.  





Still having RCVAT but less so.





Objective


Vital Signs





Vital Signs








  Date Time  Temp Pulse Resp B/P (MAP) Pulse Ox O2 Delivery O2 Flow Rate FiO2


 


17 16:34 98.5  18     


 


17 16:34  98  103/58 (73)    


 


17 14:45  104      


 


17 14:40  101      


 


17 14:36 98.5       


 


17 14:35  105      


 


17 14:30  102      


 


17 14:25  101      


 


17 14:20  102      


 


17 14:15  100 18     


 


17 14:14  101  107/67 (80)    


 


17 14:10  103      


 


17 14:05  107      


 


17 14:00  109      


 


17 13:45  106      


 


17 13:40  109      


 


17 13:35  106      


 


17 13:30  97      


 


17 13:25  106      


 


17 13:20  109      


 


17 13:15  107      


 


17 13:10  105      


 


17 13:05  103      


 


17 13:00  102      


 


17 12:55  101      


 


17 12:50  110      


 


17 12:45  101      


 


17 12:40  104      


 


17 12:35  108      








Lab & Micro Results











Test


  17


12:30 17


13:47


 


Urine Color YELLOW  


 


Urine Turbidity CLOUDY  


 


Urine pH 6.0  


 


Urine Specific Gravity 1.018  


 


Urine Protein 100 mg/dL  


 


Urine Glucose (UA) NEG mg/dL  


 


Urine Ketones 150 mg/dL  


 


Urine Occult Blood SMALL  


 


Urine Nitrite POS  


 


Urine Bilirubin NEG  


 


Urine Urobilinogen


  LESS THAN 2.0


MG/DL 


 


 


Urine Leukocyte Esterase LARGE  


 


Urine RBC 28 /hpf  


 


Urine WBC  /hpf  


 


Urine WBC Clumps MOD  


 


Urine Squamous Epithelial


Cells 10 /hpf 


  


 


 


Urine Bacteria MANY /hpf  


 


Urine Mucus FEW /lpf  


 


Microscopic Urinalysis Comment


  CULTURE


INDICATED 


 


 


White Blood Count  13.0 TH/MM3 


 


Red Blood Count  3.52 MIL/MM3 


 


Hemoglobin  10.7 GM/DL 


 


Hematocrit  31.9 % 


 


Mean Corpuscular Volume  90.5 FL 


 


Mean Corpuscular Hemoglobin  30.4 PG 


 


Mean Corpuscular Hemoglobin


Concent 


  33.6 % 


 


 


Red Cell Distribution Width  12.9 % 


 


Platelet Count  266 TH/MM3 


 


Mean Platelet Volume  8.3 FL 


 


Neutrophils (%) (Auto)  87.9 % 


 


Lymphocytes (%) (Auto)  5.8 % 


 


Monocytes (%) (Auto)  6.1 % 


 


Eosinophils (%) (Auto)  0.0 % 


 


Basophils (%) (Auto)  0.2 % 


 


Neutrophils # (Auto)  11.4 TH/MM3 


 


Lymphocytes # (Auto)  0.8 TH/MM3 


 


Monocytes # (Auto)  0.8 TH/MM3 


 


Eosinophils # (Auto)  0.0 TH/MM3 


 


Basophils # (Auto)  0.0 TH/MM3 


 


CBC Comment  DIFF FINAL 


 


Differential Comment   


 


Blood Urea Nitrogen  6 MG/DL 


 


Creatinine  0.41 MG/DL 


 


Random Glucose  90 MG/DL 


 


Total Protein  6.8 GM/DL 


 


Albumin  2.5 GM/DL 


 


Calcium Level  8.5 MG/DL 


 


Alkaline Phosphatase  95 U/L 


 


Aspartate Amino Transf


(AST/SGOT) 


  12 U/L 


 


 


Alanine Aminotransferase


(ALT/SGPT) 


  11 U/L 


 


 


Total Bilirubin  0.7 MG/DL 


 


Direct Bilirubin  0.1 MG/DL 


 


Sodium Level  134 MEQ/L 


 


Potassium Level  3.5 MEQ/L 


 


Chloride Level  104 MEQ/L 


 


Carbon Dioxide Level  20.1 MEQ/L 


 


Anion Gap  10 MEQ/L 


 


Estimat Glomerular Filtration


Rate 


  178 ML/MIN 


 


 


Indirect Bilirubin  0.6 MG/DL 














 Date/Time


Source Procedure


Growth Status


 


 


 17 12:30


Urine Clean Catch Urine Culture


Pending Received








Physical Exam


GENERAL: Well-nourished, well-developed patient.


CARDIOVASCULAR: Regular rate and rhythm without murmurs, gallops, or rubs.


RESPIRATORY: Breath sounds equal bilaterally. No accessory muscle use.


ABDOMEN/GI: Abdomen soft, non-tender.  maarked right CVAT


FH consistent with dates


GENITOURINARY:


External Genitalia: intact and normal in appearance





EXTREMITIES: No cyanosis or edema, non-tender, without signs of DVT.


FHR 140s with many accels and BTBV





Assessment and Plan


Assessment and Plan


Patient is a 32 year old  at 33 and 0/7 weeks gestation, OB care with Dr. Little presenting with possible pyelonephritis. 





Pyelonephritis:


Suspected based on history and presenting symptoms


UTI treated with Macrobid 7-day course 12/15- with continued symptoms


Right CVA tenderness


UA ordered and noted to have positive nitrites, large leukocyte esterase, 

innumerable WBC, 100 protein-->culture pending


IV LR bolus 1 liter, then at rate of 125ml/hr


Antibiotic course per consultation with Dr. Little: Rocephin 2g q24hr IV, 

Gentamicin 80mg q8hr IV


Patient counseled on likely 48 hr stay


Will treat contractions with terbutaline if non-resolving with fluids





Intrauterine Pregnancy:


Category 1 fetal tracing --> will continue intermittent fetal monitoring at 

least q shift


Contractions noted intermittently on monitor, will treat with IVF and monitor


Cervix closed, thick, high


Routine prenatal care


 


Hyperemesis gravidarum:


Takes Zofran PRN at home necessary, last dose this morning


IVF as above


Zofran 4mg q6hr IV PRN





WDW Dr. Burger





17 18:40





Comfortable with reassuring strip


needs 48 hours IV antibiotics











Jemima Little MD Dec 26, 2017 19:40

## 2017-12-27 VITALS — TEMPERATURE: 98.9 F | RESPIRATION RATE: 18 BRPM

## 2017-12-27 VITALS — HEART RATE: 101 BPM | SYSTOLIC BLOOD PRESSURE: 103 MMHG | DIASTOLIC BLOOD PRESSURE: 59 MMHG

## 2017-12-27 VITALS — HEART RATE: 106 BPM

## 2017-12-27 VITALS — HEART RATE: 102 BPM

## 2017-12-27 VITALS — HEART RATE: 122 BPM | DIASTOLIC BLOOD PRESSURE: 63 MMHG | SYSTOLIC BLOOD PRESSURE: 95 MMHG

## 2017-12-27 VITALS — TEMPERATURE: 100 F

## 2017-12-27 VITALS — TEMPERATURE: 101.3 F

## 2017-12-27 VITALS — TEMPERATURE: 98.4 F

## 2017-12-27 VITALS — RESPIRATION RATE: 18 BRPM | TEMPERATURE: 98.9 F

## 2017-12-27 VITALS — HEART RATE: 101 BPM

## 2017-12-27 VITALS — RESPIRATION RATE: 18 BRPM | TEMPERATURE: 99.8 F

## 2017-12-27 VITALS — DIASTOLIC BLOOD PRESSURE: 50 MMHG | HEART RATE: 102 BPM | SYSTOLIC BLOOD PRESSURE: 100 MMHG

## 2017-12-27 VITALS — HEART RATE: 89 BPM | SYSTOLIC BLOOD PRESSURE: 89 MMHG | DIASTOLIC BLOOD PRESSURE: 44 MMHG

## 2017-12-27 VITALS — TEMPERATURE: 100.7 F

## 2017-12-27 VITALS — TEMPERATURE: 99.7 F

## 2017-12-27 VITALS — TEMPERATURE: 98.4 F | RESPIRATION RATE: 18 BRPM

## 2017-12-27 VITALS — RESPIRATION RATE: 16 BRPM | TEMPERATURE: 98.4 F

## 2017-12-27 VITALS — HEART RATE: 105 BPM | DIASTOLIC BLOOD PRESSURE: 55 MMHG | SYSTOLIC BLOOD PRESSURE: 104 MMHG

## 2017-12-27 VITALS — HEART RATE: 105 BPM

## 2017-12-27 VITALS — HEART RATE: 103 BPM

## 2017-12-27 RX ADMIN — OXYTOCIN SCH MLS/HR: 10 INJECTION, SOLUTION INTRAMUSCULAR; INTRAVENOUS at 16:28

## 2017-12-27 RX ADMIN — CLINDAMYCIN PHOSPHATE SCH MLS/HR: 150 INJECTION, SOLUTION INTRAMUSCULAR; INTRAVENOUS at 06:03

## 2017-12-27 RX ADMIN — CLINDAMYCIN PHOSPHATE SCH MLS/HR: 150 INJECTION, SOLUTION INTRAMUSCULAR; INTRAVENOUS at 14:29

## 2017-12-27 RX ADMIN — Medication SCH ML: at 09:00

## 2017-12-27 RX ADMIN — Medication SCH ML: at 21:00

## 2017-12-27 RX ADMIN — OXYTOCIN SCH MLS/HR: 10 INJECTION, SOLUTION INTRAMUSCULAR; INTRAVENOUS at 20:02

## 2017-12-27 RX ADMIN — CEFTRIAXONE SODIUM SCH MLS/HR: 2 INJECTION, POWDER, FOR SOLUTION INTRAMUSCULAR; INTRAVENOUS at 13:43

## 2017-12-27 RX ADMIN — OXYTOCIN SCH MLS/HR: 10 INJECTION, SOLUTION INTRAMUSCULAR; INTRAVENOUS at 04:35

## 2017-12-27 RX ADMIN — CLINDAMYCIN PHOSPHATE SCH MLS/HR: 150 INJECTION, SOLUTION INTRAMUSCULAR; INTRAVENOUS at 22:06

## 2017-12-27 NOTE — RADRPT
EXAM DATE/TIME:  12/27/2017 09:03 

 

[facility] COMPARISON:     

No previous studies available for comparison.

        

 

INDICATIONS :     Hydronephrosis. 

                     

MEDICAL HISTORY :     Gastroesophageal reflux disease. Pregnancy.   

SURGICAL HISTORY :     None.     

ENCOUNTER:     Initial

ACUITY:     4-6 days

PAIN SCORE:     7/10

LOCATION:     Bilateral flank 

MEASUREMENTS:     

RIGHT KIDNEY:     12.0 x 6.3 x 6.9 cm

LEFT KIDNEY:     13.2 x 5.2 x 6.1 cm

 

FINDINGS:     

RIGHT KIDNEY:     Renal cortex is normal in thickness and echotexture.  The right renal pelvis is sli
ghtly prominent in size. No significant stone, or mass.  

LEFT KIDNEY:     Renal cortex is normal in thickness and echotexture.  No hydronephrosis, stone, or m
ass.  

BLADDER:     Within normal limits given the degree of distension.  

 

CONCLUSION:     

1. Slightly prominent right renal pelvis. This finding is nonspecific and may reflect residual collec
ting system prominence if patient has a history of recent hydronephrosis. However, a potential partia
lly obstructing ureteral process cannot be excluded.

 

 

 Reji Mckeon MD on December 27, 2017 at 9:44           

Board Certified Radiologist.

 This report was verified electronically.

## 2017-12-27 NOTE — PD.OB.ANTE
Subjective


Diagnosis:  


(1) Pyelonephritis affecting pregnancy in third trimester


Diagnosis:  Principal





Interval History


31  with right pyelonephritris


still having signficant RCVAT


will take own zoloft


low grade temp in night


no NV, GFM  having occasional contractions not feeling them


no leaking or bleeding





Objective


Vital Signs





Vital Signs








  Date Time  Temp Pulse Resp B/P (MAP) Pulse Ox O2 Delivery O2 Flow Rate FiO2


 


17 06:05 98.4       


 


17 02:55  101  103/59 (74)    


 


17 02:54 98.9  18     


 


17 22:50 99.3       


 


17 19:43 99.5  18     


 


17 19:32  117  109/55 (73)    


 


17 16:34 98.5  18     


 


17 16:34  98  103/58 (73)    


 


17 14:45  104      


 


17 14:40  101      


 


17 14:36 98.5       


 


17 14:35  105      


 


17 14:30  102      


 


17 14:25  101      


 


17 14:20  102      


 


17 14:15  100 18     


 


17 14:14  101  107/67 (80)    


 


17 14:10  103      


 


17 14:05  107      


 


17 14:00  109      


 


17 13:45  106      


 


17 13:40  109      


 


17 13:35  106      


 


17 13:30  97      


 


17 13:25  106      


 


17 13:20  109      


 


17 13:15  107      


 


17 13:10  105      


 


17 13:05  103      


 


17 13:00  102      


 


17 12:55  101      


 


17 12:50  110      


 


17 12:45  101      


 


17 12:40  104      


 


17 12:35  108      








Lab & Micro Results











Test


  17


12:30 17


13:47


 


Urine Color YELLOW  


 


Urine Turbidity CLOUDY  


 


Urine pH 6.0  


 


Urine Specific Gravity 1.018  


 


Urine Protein 100 mg/dL  


 


Urine Glucose (UA) NEG mg/dL  


 


Urine Ketones 150 mg/dL  


 


Urine Occult Blood SMALL  


 


Urine Nitrite POS  


 


Urine Bilirubin NEG  


 


Urine Urobilinogen


  LESS THAN 2.0


MG/DL 


 


 


Urine Leukocyte Esterase LARGE  


 


Urine RBC 28 /hpf  


 


Urine WBC  /hpf  


 


Urine WBC Clumps MOD  


 


Urine Squamous Epithelial


Cells 10 /hpf 


  


 


 


Urine Bacteria MANY /hpf  


 


Urine Mucus FEW /lpf  


 


Microscopic Urinalysis Comment


  CULTURE


INDICATED 


 


 


White Blood Count  13.0 TH/MM3 


 


Red Blood Count  3.52 MIL/MM3 


 


Hemoglobin  10.7 GM/DL 


 


Hematocrit  31.9 % 


 


Mean Corpuscular Volume  90.5 FL 


 


Mean Corpuscular Hemoglobin  30.4 PG 


 


Mean Corpuscular Hemoglobin


Concent 


  33.6 % 


 


 


Red Cell Distribution Width  12.9 % 


 


Platelet Count  266 TH/MM3 


 


Mean Platelet Volume  8.3 FL 


 


Neutrophils (%) (Auto)  87.9 % 


 


Lymphocytes (%) (Auto)  5.8 % 


 


Monocytes (%) (Auto)  6.1 % 


 


Eosinophils (%) (Auto)  0.0 % 


 


Basophils (%) (Auto)  0.2 % 


 


Neutrophils # (Auto)  11.4 TH/MM3 


 


Lymphocytes # (Auto)  0.8 TH/MM3 


 


Monocytes # (Auto)  0.8 TH/MM3 


 


Eosinophils # (Auto)  0.0 TH/MM3 


 


Basophils # (Auto)  0.0 TH/MM3 


 


CBC Comment  DIFF FINAL 


 


Differential Comment   


 


Blood Urea Nitrogen  6 MG/DL 


 


Creatinine  0.41 MG/DL 


 


Random Glucose  90 MG/DL 


 


Total Protein  6.8 GM/DL 


 


Albumin  2.5 GM/DL 


 


Calcium Level  8.5 MG/DL 


 


Alkaline Phosphatase  95 U/L 


 


Aspartate Amino Transf


(AST/SGOT) 


  12 U/L 


 


 


Alanine Aminotransferase


(ALT/SGPT) 


  11 U/L 


 


 


Total Bilirubin  0.7 MG/DL 


 


Direct Bilirubin  0.1 MG/DL 


 


Sodium Level  134 MEQ/L 


 


Potassium Level  3.5 MEQ/L 


 


Chloride Level  104 MEQ/L 


 


Carbon Dioxide Level  20.1 MEQ/L 


 


Anion Gap  10 MEQ/L 


 


Estimat Glomerular Filtration


Rate 


  178 ML/MIN 


 


 


Indirect Bilirubin  0.6 MG/DL 














 Date/Time


Source Procedure


Growth Status


 


 


 17 12:30


Urine Clean Catch Urine Culture


Pending Received








Physical Exam


GENERAL: Well-nourished, well-developed patient.


CARDIOVASCULAR: Regular rate and rhythm without murmurs, gallops, or rubs.


RESPIRATORY: Breath sounds equal bilaterally. No accessory muscle use.


ABDOMEN/GI: Abdomen soft, non-tender.


RCVAT


Strip reactive





EXTREMITIES: No cyanosis or edema, non-tender, without signs of DVT.





Assessment and Plan


Problem List:  


(1) Pyelonephritis affecting pregnancy in third trimester


ICD Codes:  O23.03 - Infections of kidney in pregnancy, third trimester


Assessment and Plan


Patient is a 32 year old  at 33 and 0/7 weeks gestation, OB care with Dr. Little presenting with possible pyelonephritis. 





Pyelonephritis:


Suspected based on history and presenting symptoms


UTI treated with Macrobid 7-day course 12/15- with continued symptoms


Right CVA tenderness


UA ordered and noted to have positive nitrites, large leukocyte esterase, 

innumerable WBC, 100 protein-->culture pending


IV LR bolus 1 liter, then at rate of 125ml/hr


Antibiotic course per consultation with Dr. Little: Rocephin 2g q24hr IV, 

Gentamicin 80mg q8hr IV


Patient counseled on likely 48 hr stay


Will treat contractions with terbutaline if non-resolving with fluids





Intrauterine Pregnancy:


Category 1 fetal tracing --> will continue intermittent fetal monitoring at 

least q shift


Contractions noted intermittently on monitor, will treat with IVF and monitor


Cervix closed, thick, high


Routine prenatal care


 


Hyperemesis gravidarum:


Takes Zofran PRN at home necessary, last dose this morning


IVF as above


Zofran 4mg q6hr IV PRN





WDW Dr. Burger





17 18:40





Comfortable with reassuring strip


needs 48 hours IV antibiotics





17  0800


Still with RCVAT


renal sonogram


continue IV antibiotics











Jemima Little MD Dec 27, 2017 08:07

## 2017-12-27 NOTE — PD.OB.ANTE
Subjective


Diagnosis:  


(1) Pyelonephritis affecting pregnancy in third trimester


Diagnosis:  Principal





Interval History


renal sono suggestive of minimal hydronephrosis on right side.


Continue current regimen





Objective


Vital Signs





Vital Signs








  Date Time  Temp Pulse Resp B/P (MAP) Pulse Ox O2 Delivery O2 Flow Rate FiO2


 


17 09:34   16     


 


17 09:34 98.4       


 


17 09:29  102  100/50 (67)    


 


17 06:05 98.4       


 


17 02:55  101  103/59 (74)    


 


17 02:54 98.9  18     


 


17 22:50 99.3       


 


17 19:43 99.5  18     


 


17 19:32  117  109/55 (73)    


 


17 16:34 98.5  18     


 


17 16:34  98  103/58 (73)    


 


17 14:45  104      


 


17 14:40  101      


 


17 14:36 98.5       


 


17 14:35  105      


 


17 14:30  102      


 


17 14:25  101      


 


17 14:20  102      


 


17 14:15  100 18     


 


17 14:14  101  107/67 (80)    


 


17 14:10  103      


 


17 14:05  107      


 


17 14:00  109      


 


17 13:45  106      


 


17 13:40  109      


 


17 13:35  106      


 


17 13:30  97      


 


17 13:25  106      


 


17 13:20  109      








Lab & Micro Results











Test


  17


13:47


 


White Blood Count 13.0 TH/MM3 


 


Red Blood Count 3.52 MIL/MM3 


 


Hemoglobin 10.7 GM/DL 


 


Hematocrit 31.9 % 


 


Mean Corpuscular Volume 90.5 FL 


 


Mean Corpuscular Hemoglobin 30.4 PG 


 


Mean Corpuscular Hemoglobin


Concent 33.6 % 


 


 


Red Cell Distribution Width 12.9 % 


 


Platelet Count 266 TH/MM3 


 


Mean Platelet Volume 8.3 FL 


 


Neutrophils (%) (Auto) 87.9 % 


 


Lymphocytes (%) (Auto) 5.8 % 


 


Monocytes (%) (Auto) 6.1 % 


 


Eosinophils (%) (Auto) 0.0 % 


 


Basophils (%) (Auto) 0.2 % 


 


Neutrophils # (Auto) 11.4 TH/MM3 


 


Lymphocytes # (Auto) 0.8 TH/MM3 


 


Monocytes # (Auto) 0.8 TH/MM3 


 


Eosinophils # (Auto) 0.0 TH/MM3 


 


Basophils # (Auto) 0.0 TH/MM3 


 


CBC Comment DIFF FINAL 


 


Differential Comment  


 


Blood Urea Nitrogen 6 MG/DL 


 


Creatinine 0.41 MG/DL 


 


Random Glucose 90 MG/DL 


 


Total Protein 6.8 GM/DL 


 


Albumin 2.5 GM/DL 


 


Calcium Level 8.5 MG/DL 


 


Alkaline Phosphatase 95 U/L 


 


Aspartate Amino Transf


(AST/SGOT) 12 U/L 


 


 


Alanine Aminotransferase


(ALT/SGPT) 11 U/L 


 


 


Total Bilirubin 0.7 MG/DL 


 


Direct Bilirubin 0.1 MG/DL 


 


Sodium Level 134 MEQ/L 


 


Potassium Level 3.5 MEQ/L 


 


Chloride Level 104 MEQ/L 


 


Carbon Dioxide Level 20.1 MEQ/L 


 


Anion Gap 10 MEQ/L 


 


Estimat Glomerular Filtration


Rate 178 ML/MIN 


 


 


Indirect Bilirubin 0.6 MG/DL 














 Date/Time


Source Procedure


Growth Status


 


 


 17 12:30


Urine Clean Catch Urine Culture - Preliminary


Gram Negative Fede Resulted








Physical Exam


GENERAL: Well-nourished, well-developed patient.


CARDIOVASCULAR: Regular rate and rhythm without murmurs, gallops, or rubs.


RESPIRATORY: Breath sounds equal bilaterally. No accessory muscle use.


ABDOMEN/GI: Abdomen soft, non-tender.


  Fundus: [-]


GENITOURINARY:


External Genitalia: intact and normal in appearance


  Cervix: [-]


  Dilatation: [-]


  Effacement: [-]


  Station: [-]


  Presentation: [-]


  Membranes: [-]


  Uterine Contractions: [-]


FHT's:


  Category: [-]


  Baseline: [-]


  Reactive: [-]


  Variability: [-]


  Decels: [-]


EXTREMITIES: No cyanosis or edema, non-tender, without signs of DVT.





Assessment and Plan


Problem List:  


(1) Pyelonephritis affecting pregnancy in third trimester


ICD Codes:  O23.03 - Infections of kidney in pregnancy, third trimester


Assessment and Plan


Patient is a 32 year old  at 33 and 0/7 weeks gestation, OB care with Dr. Little presenting with possible pyelonephritis. 





Pyelonephritis:


Suspected based on history and presenting symptoms


UTI treated with Macrobid 7-day course 12/15- with continued symptoms


Right CVA tenderness


UA ordered and noted to have positive nitrites, large leukocyte esterase, 

innumerable WBC, 100 protein-->culture pending


IV LR bolus 1 liter, then at rate of 125ml/hr


Antibiotic course per consultation with Dr. Little: Rocephin 2g q24hr IV, 

Gentamicin 80mg q8hr IV


Patient counseled on likely 48 hr stay


Will treat contractions with terbutaline if non-resolving with fluids





Intrauterine Pregnancy:


Category 1 fetal tracing --> will continue intermittent fetal monitoring at 

least q shift


Contractions noted intermittently on monitor, will treat with IVF and monitor


Cervix closed, thick, high


Routine prenatal care


 


Hyperemesis gravidarum:


Takes Zofran PRN at home necessary, last dose this morning


IVF as above


Zofran 4mg q6hr IV PRN





WDW Dr. Burger





17 18:40





Comfortable with reassuring strip


needs 48 hours IV antibiotics





17  0800


Still with RCVAT


renal sonogram


continue IV antibiotics











Jemima Little MD Dec 27, 2017 13:20

## 2017-12-28 VITALS — HEART RATE: 96 BPM

## 2017-12-28 VITALS — SYSTOLIC BLOOD PRESSURE: 109 MMHG | HEART RATE: 89 BPM | DIASTOLIC BLOOD PRESSURE: 60 MMHG

## 2017-12-28 VITALS — RESPIRATION RATE: 18 BRPM

## 2017-12-28 VITALS — HEART RATE: 97 BPM

## 2017-12-28 VITALS — TEMPERATURE: 97.7 F

## 2017-12-28 VITALS — HEART RATE: 102 BPM | DIASTOLIC BLOOD PRESSURE: 58 MMHG | SYSTOLIC BLOOD PRESSURE: 103 MMHG | TEMPERATURE: 98.2 F

## 2017-12-28 VITALS — DIASTOLIC BLOOD PRESSURE: 51 MMHG | HEART RATE: 128 BPM | SYSTOLIC BLOOD PRESSURE: 98 MMHG

## 2017-12-28 VITALS — HEART RATE: 100 BPM

## 2017-12-28 VITALS — HEART RATE: 95 BPM

## 2017-12-28 VITALS — RESPIRATION RATE: 17 BRPM

## 2017-12-28 VITALS — DIASTOLIC BLOOD PRESSURE: 54 MMHG | HEART RATE: 89 BPM | SYSTOLIC BLOOD PRESSURE: 93 MMHG

## 2017-12-28 VITALS — TEMPERATURE: 101.5 F

## 2017-12-28 VITALS — TEMPERATURE: 99.7 F

## 2017-12-28 VITALS — HEART RATE: 89 BPM

## 2017-12-28 VITALS — HEART RATE: 92 BPM

## 2017-12-28 VITALS — TEMPERATURE: 97.9 F

## 2017-12-28 VITALS — RESPIRATION RATE: 20 BRPM

## 2017-12-28 VITALS — TEMPERATURE: 98.9 F

## 2017-12-28 VITALS — HEART RATE: 93 BPM

## 2017-12-28 VITALS — HEART RATE: 98 BPM

## 2017-12-28 RX ADMIN — Medication SCH ML: at 08:54

## 2017-12-28 RX ADMIN — CLINDAMYCIN PHOSPHATE SCH MLS/HR: 150 INJECTION, SOLUTION INTRAMUSCULAR; INTRAVENOUS at 14:11

## 2017-12-28 RX ADMIN — Medication SCH ML: at 21:00

## 2017-12-28 RX ADMIN — CEFTRIAXONE SODIUM SCH MLS/HR: 2 INJECTION, POWDER, FOR SOLUTION INTRAMUSCULAR; INTRAVENOUS at 14:12

## 2017-12-28 RX ADMIN — CLINDAMYCIN PHOSPHATE SCH MLS/HR: 150 INJECTION, SOLUTION INTRAMUSCULAR; INTRAVENOUS at 21:38

## 2017-12-28 RX ADMIN — CLINDAMYCIN PHOSPHATE SCH MLS/HR: 150 INJECTION, SOLUTION INTRAMUSCULAR; INTRAVENOUS at 06:00

## 2017-12-28 NOTE — PD.OB.ANTE
Subjective


Diagnosis:  


(1) Pyelonephritis affecting pregnancy in third trimester


Diagnosis:  Principal





Interval History


31  with right pyelo  


She is sleeping and I did not wake her up, since I evaluated her at 3 am





Objective


Vital Signs





Vital Signs








  Date Time  Temp Pulse Resp B/P (MAP) Pulse Ox O2 Delivery O2 Flow Rate FiO2


 


17 06:22   18     


 


17 06:21 97.7       


 


17 03:59 99.7       


 


17 03:00   20     


 


17 02:05  128  98/51 (67)    


 


17 02:00 101.5       


 


17 23:35 99.7       


 


17 21:50 98.4  18     


 


17 21:49  105  104/55 (71)    


 


17 21:45  101      


 


17 21:40  101      


 


17 21:35  102      


 


17 21:30  101      


 


17 21:25  106      


 


17 21:20  105      


 


17 21:15  103      


 


17 20:03 98.9  18     


 


17 20:01  89  89/44 (59)    


 


17 19:15   18     


 


17 18:29 100.0       


 


17 18:00 101.3       


 


17 16:00 100.7       


 


17 13:57 99.8  18     


 


17 13:50  122  95/63 (74)    


 


17 09:34   16     


 


17 09:34 98.4       


 


17 09:29  102  100/50 (67)    








Lab & Micro Results











 Date/Time


Source Procedure


Growth Status


 


 


 17 03:50


Blood Peripheral Aerobic Blood Culture


Pending Received


 


 17 03:50


Blood Peripheral Anaerobic Blood Culture


Pending Received


 


 17 12:30


Urine Clean Catch Urine Culture - Preliminary


Gram Negative Fede Resulted








Physical Exam


GENERAL: Well-nourished, well-developed patient.


Strip is reactive with good BTBV


no fever after 3 am


EXTREMITIES: No cyanosis or edema, non-tender, without signs of DVT.





Assessment and Plan


Problem List:  


(1) Pyelonephritis affecting pregnancy in third trimester


ICD Codes:  O23.03 - Infections of kidney in pregnancy, third trimester


Assessment and Plan


Patient is a 32 year old  at 33 and 0/7 weeks gestation, OB care with Dr. Little presenting with possible pyelonephritis. 





Pyelonephritis:


Suspected based on history and presenting symptoms


UTI treated with Macrobid 7-day course 12/15- with continued symptoms


Right CVA tenderness


UA ordered and noted to have positive nitrites, large leukocyte esterase, 

innumerable WBC, 100 protein-->culture pending


IV LR bolus 1 liter, then at rate of 125ml/hr


Antibiotic course per consultation with Dr. Little: Rocephin 2g q24hr IV, 

Gentamicin 80mg q8hr IV


Patient counseled on likely 48 hr stay


Will treat contractions with terbutaline if non-resolving with fluids





Intrauterine Pregnancy:


Category 1 fetal tracing --> will continue intermittent fetal monitoring at 

least q shift


Contractions noted intermittently on monitor, will treat with IVF and monitor


Cervix closed, thick, high


Routine prenatal care


 


Hyperemesis gravidarum:


Takes Zofran PRN at home necessary, last dose this morning


IVF as above


Zofran 4mg q6hr IV PRN





WDW Dr. Burger





17 18:40





Comfortable with reassuring strip


needs 48 hours IV antibiotics





17  0800


Still with RCVAT


renal sonogram


continue IV antibiotics





17  0300





second Tspike in 24 hours with blood cultures ordered and ofirmev to be given


culture of urine growing gram neg rods with I & D pending


Will change antibiotics if needed.  


Called grandmother earlier at request for reassurance





17   0800





Right pyelonephritis with temp spikes on rocephin and gentamicin 


anticipate resolution with IV antibiotics


cultures pending











Jemima Little MD Dec 28, 2017 08:05

## 2017-12-28 NOTE — PD.OB.ANTE
Subjective


Diagnosis:  


(1) Pyelonephritis affecting pregnancy in third trimester


Diagnosis:  Principal





Interval History


hospital day 2  Antibiotics day 2  33 2/7





shakes and chills with temperature spike x 2  last one 101.5  (tmax at 2 am)





Objective


Vital Signs





Vital Signs








  Date Time  Temp Pulse Resp B/P (MAP) Pulse Ox O2 Delivery O2 Flow Rate FiO2


 


17 02:05  128  98/51 (67)    


 


17 02:00 101.5       


 


17 23:35 99.7       


 


17 21:50 98.4  18     


 


17 21:49  105  104/55 (71)    


 


17 21:45  101      


 


17 21:40  101      


 


17 21:35  102      


 


17 21:30  101      


 


17 21:25  106      


 


17 21:20  105      


 


17 21:15  103      


 


17 20:03 98.9  18     


 


17 20:01  89  89/44 (59)    


 


17 19:15   18     


 


17 18:29 100.0       


 


17 18:00 101.3       


 


17 16:00 100.7       


 


17 13:57 99.8  18     


 


17 13:50  122  95/63 (74)    


 


17 09:34   16     


 


17 09:34 98.4       


 


17 09:29  102  100/50 (67)    


 


17 06:05 98.4       


 


17 02:55  101  103/59 (74)    


 


17 02:54 98.9  18     








Lab & Micro Results











 Date/Time


Source Procedure


Growth Status


 


 


 17 12:30


Urine Clean Catch Urine Culture - Preliminary


Gram Negative Fede Resulted








Physical Exam


GENERAL: Well-nourished, well-developed patient.


CARDIOVASCULAR: Regular rate and rhythm without murmurs, gallops, or rubs.


RESPIRATORY: Breath sounds equal bilaterally. No accessory muscle use.


ABDOMEN/GI: Abdomen soft, non-tender.





strip category 1


EXTREMITIES: No cyanosis or edema, non-tender, without signs of DVT.





Assessment and Plan


Problem List:  


(1) Pyelonephritis affecting pregnancy in third trimester


ICD Codes:  O23.03 - Infections of kidney in pregnancy, third trimester


Assessment and Plan


Patient is a 32 year old  at 33 and 0/7 weeks gestation, OB care with Dr. Little presenting with possible pyelonephritis. 





Pyelonephritis:


Suspected based on history and presenting symptoms


UTI treated with Macrobid 7-day course 12/15- with continued symptoms


Right CVA tenderness


UA ordered and noted to have positive nitrites, large leukocyte esterase, 

innumerable WBC, 100 protein-->culture pending


IV LR bolus 1 liter, then at rate of 125ml/hr


Antibiotic course per consultation with Dr. Little: Rocephin 2g q24hr IV, 

Gentamicin 80mg q8hr IV


Patient counseled on likely 48 hr stay


Will treat contractions with terbutaline if non-resolving with fluids





Intrauterine Pregnancy:


Category 1 fetal tracing --> will continue intermittent fetal monitoring at 

least q shift


Contractions noted intermittently on monitor, will treat with IVF and monitor


Cervix closed, thick, high


Routine prenatal care


 


Hyperemesis gravidarum:


Takes Zofran PRN at home necessary, last dose this morning


IVF as above


Zofran 4mg q6hr IV PRN





WDW Dr. Burger





17 18:40





Comfortable with reassuring strip


needs 48 hours IV antibiotics





17  0800


Still with RCVAT


renal sonogram


continue IV antibiotics





17  0300





second Tspike in 24 hours with blood cultures ordered and ofirmev to be given


culture of urine growing gram neg rods with I & D pending


Will change antibiotics if needed.  


Called grandmother earlier at request for reassurance











Jemima Little MD Dec 28, 2017 02:53

## 2017-12-29 VITALS
HEART RATE: 87 BPM | DIASTOLIC BLOOD PRESSURE: 57 MMHG | TEMPERATURE: 98.5 F | SYSTOLIC BLOOD PRESSURE: 104 MMHG | RESPIRATION RATE: 18 BRPM

## 2017-12-29 VITALS — HEART RATE: 80 BPM

## 2017-12-29 VITALS — TEMPERATURE: 98.7 F | RESPIRATION RATE: 18 BRPM

## 2017-12-29 VITALS
RESPIRATION RATE: 18 BRPM | SYSTOLIC BLOOD PRESSURE: 109 MMHG | DIASTOLIC BLOOD PRESSURE: 71 MMHG | HEART RATE: 90 BPM | TEMPERATURE: 98.5 F

## 2017-12-29 VITALS — HEART RATE: 86 BPM

## 2017-12-29 VITALS — TEMPERATURE: 98.9 F | RESPIRATION RATE: 18 BRPM

## 2017-12-29 VITALS — HEART RATE: 88 BPM | DIASTOLIC BLOOD PRESSURE: 68 MMHG | SYSTOLIC BLOOD PRESSURE: 97 MMHG

## 2017-12-29 VITALS — HEART RATE: 74 BPM

## 2017-12-29 VITALS — HEART RATE: 72 BPM

## 2017-12-29 VITALS — HEART RATE: 76 BPM

## 2017-12-29 VITALS — HEART RATE: 88 BPM

## 2017-12-29 VITALS — DIASTOLIC BLOOD PRESSURE: 55 MMHG | HEART RATE: 77 BPM | SYSTOLIC BLOOD PRESSURE: 94 MMHG

## 2017-12-29 VITALS — TEMPERATURE: 98.1 F | RESPIRATION RATE: 18 BRPM

## 2017-12-29 VITALS — HEART RATE: 71 BPM

## 2017-12-29 VITALS — HEART RATE: 97 BPM | DIASTOLIC BLOOD PRESSURE: 67 MMHG | SYSTOLIC BLOOD PRESSURE: 103 MMHG

## 2017-12-29 VITALS — TEMPERATURE: 98 F

## 2017-12-29 VITALS — HEART RATE: 70 BPM

## 2017-12-29 VITALS — SYSTOLIC BLOOD PRESSURE: 103 MMHG | DIASTOLIC BLOOD PRESSURE: 55 MMHG | HEART RATE: 91 BPM

## 2017-12-29 VITALS — TEMPERATURE: 99 F

## 2017-12-29 VITALS — HEART RATE: 87 BPM

## 2017-12-29 VITALS — HEART RATE: 75 BPM

## 2017-12-29 VITALS — HEART RATE: 89 BPM

## 2017-12-29 RX ADMIN — CEPHALEXIN SCH MG: 500 CAPSULE ORAL at 21:09

## 2017-12-29 RX ADMIN — CEPHALEXIN SCH MG: 500 CAPSULE ORAL at 11:11

## 2017-12-29 RX ADMIN — Medication SCH ML: at 09:00

## 2017-12-29 RX ADMIN — CLINDAMYCIN PHOSPHATE SCH MLS/HR: 150 INJECTION, SOLUTION INTRAMUSCULAR; INTRAVENOUS at 05:44

## 2017-12-29 RX ADMIN — Medication SCH ML: at 21:30

## 2017-12-29 NOTE — PD.OB.ANTE
Subjective


Diagnosis:  


(1) Pyelonephritis affecting pregnancy in third trimester


Diagnosis:  Principal





Interval History


good fetal movement. Mild contractions still.  Still has rcva tenderness but 

improved.  Had a better night as she did not feel hot or have night sweats


Antepartum ROS:  Reports: Fetal movement normal, Contractions, 


   Denies: New complaints, Loss of fluid, Vaginal bleeding, Other





Objective


Vital Signs





Vital Signs








  Date Time  Temp Pulse Resp B/P (MAP) Pulse Ox O2 Delivery O2 Flow Rate FiO2


 


17 05:41 98.7  18     


 


17 05:34  91  103/55 (71)    


 


17 00:00 98.9       


 


17 00:00   18     


 


17 23:41  89  93/54 (67)    


 


17 20:45  97      


 


17 20:40  96      


 


17 20:35  100      


 


17 20:30  96      


 


17 20:25  98      


 


17 20:20  98      


 


17 20:15  100      


 


17 20:14   18     


 


17 20:06 98.2       


 


17 20:06  102  103/58 (73)    


 


17 16:03 98.9       


 


17 15:55  96      


 


17 15:50  89      


 


17 15:45  93      


 


17 15:40  96      


 


17 15:35  95      


 


17 15:30  92      


 


17 15:27  89  109/60 (76)    


 


17 15:00   18     


 


17 13:00   17     


 


17 12:35 97.9       


 


17 11:00   17     


 


17 09:00   17     








Lab & Micro Results











 Date/Time


Source Procedure


Growth Status


 


 


 17 03:50


Blood Peripheral Aerobic Blood Culture


Pending Received


 


 17 03:50


Blood Peripheral Anaerobic Blood Culture


Pending Received


 


 17 12:30


Urine Clean Catch Urine Culture - Final


Escherichia Coli Complete








Physical Exam


GENERAL: Well-nourished, well-developed patient.


CARDIOVASCULAR: Regular rate and rhythm without murmurs, gallops, or rubs.


RESPIRATORY: Breath sounds equal bilaterally. No accessory muscle use.


ABDOMEN/GI: Abdomen soft, non-tender. RCVA tenderness


  Fundus: [-]


GENITOURINARY:


External Genitalia: intact and normal in appearance


  Cervix: cl/th


  Uterine Contractions: [q 5 min


FHT's:


  Category:I last night





EXTREMITIES: No cyanosis or edema, non-tender, without signs of DVT.





Assessment and Plan


Problem List:  


(1) Pyelonephritis affecting pregnancy in third trimester


ICD Codes:  O23.03 - Infections of kidney in pregnancy, third trimester


Assessment and Plan


Patient is a 32 year old  at 33 and 0/7 weeks gestation, OB care with Dr. Little presenting with possible pyelonephritis. 





Pyelonephritis:


UTI treated with Macrobid 7-day course 12/15- with continued symptoms


Right CVA tenderness. Renal u/s performed, no renal abscess. 


Cx ecoli. On Gent and Rocephin. Afebrile for 28 hours. will await 48 hours 

afebrile prior to d/c.   Will switch to po abx now, keflex bid and then daily 

for suppression for remainder of pregnancy. 


Bl cx result pending





Intrauterine Pregnancy:


Category 1 fetal tracing --> will continue intermittent fetal monitoring at 

least q shift


Contractions noted intermittently on monitor,


 


Hyperemesis gravidarum:


Takes Zofran PRN at home necessary











Rachel Armas MD Dec 29, 2017 08:09

## 2017-12-30 VITALS — RESPIRATION RATE: 16 BRPM

## 2017-12-30 VITALS — HEART RATE: 82 BPM | DIASTOLIC BLOOD PRESSURE: 52 MMHG | SYSTOLIC BLOOD PRESSURE: 89 MMHG

## 2017-12-30 VITALS — TEMPERATURE: 98.1 F

## 2017-12-30 VITALS — TEMPERATURE: 98.6 F

## 2017-12-30 VITALS — TEMPERATURE: 98 F

## 2017-12-30 RX ADMIN — CEPHALEXIN SCH MG: 500 CAPSULE ORAL at 08:43

## 2017-12-30 NOTE — PD.OB.ANTE
Subjective


Diagnosis:  


(1) Pyelonephritis affecting pregnancy in third trimester


Diagnosis:  Principal








Objective


Vital Signs





Vital Signs








  Date Time  Temp Pulse Resp B/P (MAP) Pulse Ox O2 Delivery O2 Flow Rate FiO2


 


17 08:00   16     


 


17 07:49  82  89/52 (64)    


 


17 07:48 98.0       


 


17 06:00 98.6       


 


17 02:30 98.1       


 


17 23:25  88  97/68 (78)    


 


17 23:24 99.0       


 


17 23:15  89      


 


17 23:10  87      


 


17 23:05  88      


 


17 23:00  86      


 


17 19:57   18     


 


17 19:57  90  109/71 (84)    


 


17 19:57 98.5       


 


17 17:46 98.0       


 


17 17:45  97  103/67 (79)    


 


17 14:55  80      


 


17 14:50  86      


 


17 14:45  75      


 


17 14:40  76      


 


17 14:35  74      


 


17 14:30  72      


 


17 14:25  71      


 


17 14:20  71      


 


17 14:15  70      


 


17 14:10  74      


 


17 14:00 98.1  18     


 


17 13:47  77  94/55 (68)    








Lab & Micro Results











 Date/Time


Source Procedure


Growth Status


 


 


 17 03:50


Blood Peripheral Aerobic Blood Culture - Preliminary


NO GROWTH IN 1 DAY Resulted


 


 17 03:50


Blood Peripheral Anaerobic Blood Culture - Preliminary


NO GROWTH IN 1 DAY Resulted


 


 17 12:30


Urine Clean Catch Urine Culture - Final


Escherichia Coli Complete








Physical Exam


GENERAL: Well-nourished, well-developed patient.


CARDIOVASCULAR: Regular rate and rhythm without murmurs, gallops, or rubs.


RESPIRATORY: Breath sounds equal bilaterally. No accessory muscle use.


ABDOMEN/GI: Abdomen soft, non-tender.


  Fundus: [-]


GENITOURINARY:


External Genitalia: intact and normal in appearance


 defer


FHT's:


 in progress


EXTREMITIES: No cyanosis or edema, non-tender, without signs of DVT.





Assessment and Plan


Problem List:  


(1) Pyelonephritis affecting pregnancy in third trimester


ICD Codes:  O23.03 - Infections of kidney in pregnancy, third trimester


Assessment and Plan


Patient is a 32 year old  at 33 and 4/7 weeks gestation, OB care with Dr. Little presenting with possible pyelonephritis. 





Pyelonephritis:


UTI treated with Macrobid 7-day course 12/15- with continued symptoms


Right CVA tenderness. Renal u/s performed, no renal abscess. 


Cx ecoli. On Gent and Rocephin. Afebrile for 28 hours then switched to po 

keflex and she has remoained afebrile x 24 hours. keflex bid x 1 wk and then 

daily for suppression for remainder of pregnancy. 


Bl cx result pending, neg x 1 day





Intrauterine Pregnancy:


Category 1 fetal tracing  yesterday, repeat today prior to d/c


Contractions noted intermittently on monitor, cl th high yesterday


 


Hyperemesis gravidarum:


Takes Zofran PRN at home necessary





D/c home today after reactive tracing











Rachel Armas MD Dec 30, 2017 09:52

## 2018-01-24 ENCOUNTER — HOSPITAL ENCOUNTER (INPATIENT)
Dept: HOSPITAL 17 - HOBED | Age: 33
LOS: 2 days | Discharge: HOME | End: 2018-01-26
Attending: OBSTETRICS & GYNECOLOGY | Admitting: OBSTETRICS & GYNECOLOGY
Payer: COMMERCIAL

## 2018-01-24 VITALS — DIASTOLIC BLOOD PRESSURE: 78 MMHG | SYSTOLIC BLOOD PRESSURE: 116 MMHG | HEART RATE: 104 BPM

## 2018-01-24 VITALS — HEART RATE: 103 BPM | SYSTOLIC BLOOD PRESSURE: 101 MMHG | DIASTOLIC BLOOD PRESSURE: 53 MMHG

## 2018-01-24 VITALS — HEART RATE: 84 BPM | SYSTOLIC BLOOD PRESSURE: 111 MMHG | DIASTOLIC BLOOD PRESSURE: 66 MMHG

## 2018-01-24 VITALS — DIASTOLIC BLOOD PRESSURE: 73 MMHG | HEART RATE: 98 BPM | SYSTOLIC BLOOD PRESSURE: 111 MMHG

## 2018-01-24 VITALS — DIASTOLIC BLOOD PRESSURE: 67 MMHG | SYSTOLIC BLOOD PRESSURE: 119 MMHG | HEART RATE: 91 BPM

## 2018-01-24 VITALS — SYSTOLIC BLOOD PRESSURE: 116 MMHG | HEART RATE: 282 BPM | DIASTOLIC BLOOD PRESSURE: 77 MMHG

## 2018-01-24 VITALS — RESPIRATION RATE: 18 BRPM

## 2018-01-24 VITALS — HEART RATE: 108 BPM | SYSTOLIC BLOOD PRESSURE: 113 MMHG | DIASTOLIC BLOOD PRESSURE: 64 MMHG

## 2018-01-24 VITALS — SYSTOLIC BLOOD PRESSURE: 117 MMHG | HEART RATE: 79 BPM | DIASTOLIC BLOOD PRESSURE: 68 MMHG

## 2018-01-24 VITALS
DIASTOLIC BLOOD PRESSURE: 52 MMHG | RESPIRATION RATE: 18 BRPM | HEART RATE: 87 BPM | SYSTOLIC BLOOD PRESSURE: 94 MMHG | TEMPERATURE: 98.7 F

## 2018-01-24 VITALS — HEART RATE: 82 BPM | SYSTOLIC BLOOD PRESSURE: 114 MMHG | DIASTOLIC BLOOD PRESSURE: 75 MMHG

## 2018-01-24 VITALS — SYSTOLIC BLOOD PRESSURE: 113 MMHG | HEART RATE: 99 BPM | DIASTOLIC BLOOD PRESSURE: 55 MMHG

## 2018-01-24 VITALS — TEMPERATURE: 98 F | RESPIRATION RATE: 18 BRPM

## 2018-01-24 VITALS — DIASTOLIC BLOOD PRESSURE: 77 MMHG | HEART RATE: 80 BPM | SYSTOLIC BLOOD PRESSURE: 122 MMHG

## 2018-01-24 VITALS
TEMPERATURE: 98 F | DIASTOLIC BLOOD PRESSURE: 63 MMHG | HEART RATE: 88 BPM | SYSTOLIC BLOOD PRESSURE: 98 MMHG | RESPIRATION RATE: 18 BRPM

## 2018-01-24 VITALS — HEART RATE: 85 BPM | SYSTOLIC BLOOD PRESSURE: 92 MMHG | DIASTOLIC BLOOD PRESSURE: 50 MMHG

## 2018-01-24 VITALS — DIASTOLIC BLOOD PRESSURE: 77 MMHG | SYSTOLIC BLOOD PRESSURE: 116 MMHG | HEART RATE: 82 BPM

## 2018-01-24 VITALS — HEIGHT: 61 IN | BODY MASS INDEX: 29.55 KG/M2 | WEIGHT: 156.53 LBS

## 2018-01-24 VITALS — DIASTOLIC BLOOD PRESSURE: 66 MMHG | SYSTOLIC BLOOD PRESSURE: 110 MMHG | HEART RATE: 112 BPM

## 2018-01-24 VITALS — SYSTOLIC BLOOD PRESSURE: 94 MMHG | DIASTOLIC BLOOD PRESSURE: 58 MMHG | HEART RATE: 100 BPM

## 2018-01-24 VITALS
SYSTOLIC BLOOD PRESSURE: 111 MMHG | RESPIRATION RATE: 18 BRPM | OXYGEN SATURATION: 100 % | DIASTOLIC BLOOD PRESSURE: 74 MMHG | HEART RATE: 78 BPM | TEMPERATURE: 97.4 F

## 2018-01-24 VITALS — DIASTOLIC BLOOD PRESSURE: 78 MMHG | HEART RATE: 81 BPM | SYSTOLIC BLOOD PRESSURE: 118 MMHG

## 2018-01-24 VITALS — HEART RATE: 80 BPM | DIASTOLIC BLOOD PRESSURE: 66 MMHG | SYSTOLIC BLOOD PRESSURE: 113 MMHG

## 2018-01-24 VITALS — HEART RATE: 82 BPM | DIASTOLIC BLOOD PRESSURE: 72 MMHG | SYSTOLIC BLOOD PRESSURE: 116 MMHG

## 2018-01-24 VITALS — TEMPERATURE: 97.9 F

## 2018-01-24 VITALS — DIASTOLIC BLOOD PRESSURE: 56 MMHG | SYSTOLIC BLOOD PRESSURE: 112 MMHG | HEART RATE: 92 BPM

## 2018-01-24 VITALS — HEART RATE: 91 BPM | SYSTOLIC BLOOD PRESSURE: 122 MMHG | DIASTOLIC BLOOD PRESSURE: 68 MMHG

## 2018-01-24 VITALS — HEART RATE: 87 BPM

## 2018-01-24 VITALS — HEART RATE: 92 BPM

## 2018-01-24 VITALS — SYSTOLIC BLOOD PRESSURE: 124 MMHG | HEART RATE: 91 BPM | DIASTOLIC BLOOD PRESSURE: 83 MMHG

## 2018-01-24 VITALS — HEART RATE: 86 BPM

## 2018-01-24 VITALS — HEART RATE: 95 BPM | SYSTOLIC BLOOD PRESSURE: 121 MMHG | DIASTOLIC BLOOD PRESSURE: 74 MMHG

## 2018-01-24 VITALS — SYSTOLIC BLOOD PRESSURE: 116 MMHG | HEART RATE: 126 BPM | DIASTOLIC BLOOD PRESSURE: 62 MMHG

## 2018-01-24 VITALS — HEART RATE: 90 BPM

## 2018-01-24 VITALS — HEART RATE: 95 BPM | SYSTOLIC BLOOD PRESSURE: 119 MMHG | DIASTOLIC BLOOD PRESSURE: 67 MMHG

## 2018-01-24 VITALS — SYSTOLIC BLOOD PRESSURE: 107 MMHG | DIASTOLIC BLOOD PRESSURE: 62 MMHG | HEART RATE: 140 BPM

## 2018-01-24 VITALS — RESPIRATION RATE: 18 BRPM | TEMPERATURE: 98.2 F

## 2018-01-24 VITALS — SYSTOLIC BLOOD PRESSURE: 118 MMHG | HEART RATE: 89 BPM | DIASTOLIC BLOOD PRESSURE: 65 MMHG

## 2018-01-24 VITALS — HEART RATE: 93 BPM | SYSTOLIC BLOOD PRESSURE: 114 MMHG | DIASTOLIC BLOOD PRESSURE: 80 MMHG

## 2018-01-24 VITALS — RESPIRATION RATE: 18 BRPM | DIASTOLIC BLOOD PRESSURE: 66 MMHG | HEART RATE: 83 BPM | SYSTOLIC BLOOD PRESSURE: 110 MMHG

## 2018-01-24 VITALS — HEART RATE: 101 BPM

## 2018-01-24 VITALS — DIASTOLIC BLOOD PRESSURE: 68 MMHG | SYSTOLIC BLOOD PRESSURE: 123 MMHG | HEART RATE: 88 BPM

## 2018-01-24 VITALS — HEART RATE: 94 BPM | SYSTOLIC BLOOD PRESSURE: 112 MMHG | DIASTOLIC BLOOD PRESSURE: 66 MMHG

## 2018-01-24 VITALS — DIASTOLIC BLOOD PRESSURE: 75 MMHG | HEART RATE: 85 BPM | SYSTOLIC BLOOD PRESSURE: 121 MMHG

## 2018-01-24 VITALS — DIASTOLIC BLOOD PRESSURE: 73 MMHG | SYSTOLIC BLOOD PRESSURE: 112 MMHG | HEART RATE: 85 BPM

## 2018-01-24 VITALS — SYSTOLIC BLOOD PRESSURE: 113 MMHG | HEART RATE: 89 BPM | DIASTOLIC BLOOD PRESSURE: 61 MMHG

## 2018-01-24 VITALS — HEART RATE: 97 BPM | DIASTOLIC BLOOD PRESSURE: 64 MMHG | SYSTOLIC BLOOD PRESSURE: 111 MMHG

## 2018-01-24 VITALS — DIASTOLIC BLOOD PRESSURE: 77 MMHG | SYSTOLIC BLOOD PRESSURE: 117 MMHG | HEART RATE: 84 BPM

## 2018-01-24 VITALS — HEART RATE: 98 BPM | DIASTOLIC BLOOD PRESSURE: 61 MMHG | SYSTOLIC BLOOD PRESSURE: 88 MMHG

## 2018-01-24 VITALS — DIASTOLIC BLOOD PRESSURE: 74 MMHG | HEART RATE: 76 BPM | SYSTOLIC BLOOD PRESSURE: 120 MMHG

## 2018-01-24 VITALS — DIASTOLIC BLOOD PRESSURE: 68 MMHG | HEART RATE: 93 BPM | SYSTOLIC BLOOD PRESSURE: 113 MMHG

## 2018-01-24 VITALS — HEART RATE: 88 BPM | DIASTOLIC BLOOD PRESSURE: 85 MMHG | SYSTOLIC BLOOD PRESSURE: 131 MMHG

## 2018-01-24 VITALS — SYSTOLIC BLOOD PRESSURE: 105 MMHG | DIASTOLIC BLOOD PRESSURE: 61 MMHG | HEART RATE: 90 BPM

## 2018-01-24 VITALS — SYSTOLIC BLOOD PRESSURE: 114 MMHG | DIASTOLIC BLOOD PRESSURE: 67 MMHG | HEART RATE: 92 BPM

## 2018-01-24 VITALS — TEMPERATURE: 98.5 F | RESPIRATION RATE: 18 BRPM

## 2018-01-24 VITALS — HEART RATE: 94 BPM

## 2018-01-24 VITALS — HEART RATE: 92 BPM | DIASTOLIC BLOOD PRESSURE: 64 MMHG | SYSTOLIC BLOOD PRESSURE: 107 MMHG

## 2018-01-24 VITALS — DIASTOLIC BLOOD PRESSURE: 73 MMHG | SYSTOLIC BLOOD PRESSURE: 118 MMHG | HEART RATE: 90 BPM

## 2018-01-24 VITALS — HEART RATE: 99 BPM

## 2018-01-24 VITALS — HEART RATE: 108 BPM

## 2018-01-24 VITALS — HEART RATE: 103 BPM

## 2018-01-24 VITALS — HEART RATE: 97 BPM

## 2018-01-24 VITALS — HEART RATE: 82 BPM

## 2018-01-24 VITALS — HEART RATE: 100 BPM

## 2018-01-24 DIAGNOSIS — Z3A.37: ICD-10-CM

## 2018-01-24 LAB
BACTERIA #/AREA URNS HPF: (no result) /HPF
BASOPHILS # BLD AUTO: 0 TH/MM3 (ref 0–0.2)
BASOPHILS NFR BLD: 0.5 % (ref 0–2)
COLOR UR: YELLOW
EOSINOPHIL # BLD: 0 TH/MM3 (ref 0–0.4)
EOSINOPHIL NFR BLD: 0.5 % (ref 0–4)
ERYTHROCYTE [DISTWIDTH] IN BLOOD BY AUTOMATED COUNT: 13.6 % (ref 11.6–17.2)
GLUCOSE UR STRIP-MCNC: 300 MG/DL
HCT VFR BLD CALC: 30 % (ref 35–46)
HGB BLD-MCNC: 10.5 GM/DL (ref 11.6–15.3)
HGB UR QL STRIP: (no result)
KETONES UR STRIP-MCNC: (no result) MG/DL
LYMPHOCYTES # BLD AUTO: 1.7 TH/MM3 (ref 1–4.8)
LYMPHOCYTES NFR BLD AUTO: 22 % (ref 9–44)
MCH RBC QN AUTO: 30.9 PG (ref 27–34)
MCHC RBC AUTO-ENTMCNC: 35 % (ref 32–36)
MCV RBC AUTO: 88.2 FL (ref 80–100)
MONOCYTE #: 0.6 TH/MM3 (ref 0–0.9)
MONOCYTES NFR BLD: 7.5 % (ref 0–8)
MUCOUS THREADS #/AREA URNS LPF: (no result) /LPF
NEUTROPHILS # BLD AUTO: 5.4 TH/MM3 (ref 1.8–7.7)
NEUTROPHILS NFR BLD AUTO: 69.5 % (ref 16–70)
NITRITE UR QL STRIP: (no result)
PLATELET # BLD: 272 TH/MM3 (ref 150–450)
PMV BLD AUTO: 8.3 FL (ref 7–11)
RBC # BLD AUTO: 3.4 MIL/MM3 (ref 4–5.3)
SP GR UR STRIP: 1.02 (ref 1–1.03)
SQUAMOUS #/AREA URNS HPF: 28 /HPF (ref 0–5)
TRANS CELLS #/AREA URNS HPF: <1 /HPF
URINE LEUKOCYTE ESTERASE: (no result)
WBC # BLD AUTO: 7.9 TH/MM3 (ref 4–11)

## 2018-01-24 PROCEDURE — 85025 COMPLETE CBC W/AUTO DIFF WBC: CPT

## 2018-01-24 PROCEDURE — 80307 DRUG TEST PRSMV CHEM ANLYZR: CPT

## 2018-01-24 PROCEDURE — 90715 TDAP VACCINE 7 YRS/> IM: CPT

## 2018-01-24 PROCEDURE — 86900 BLOOD TYPING SEROLOGIC ABO: CPT

## 2018-01-24 PROCEDURE — 86901 BLOOD TYPING SEROLOGIC RH(D): CPT

## 2018-01-24 PROCEDURE — 0HQ9XZZ REPAIR PERINEUM SKIN, EXTERNAL APPROACH: ICD-10-PCS | Performed by: OBSTETRICS & GYNECOLOGY

## 2018-01-24 PROCEDURE — 81001 URINALYSIS AUTO W/SCOPE: CPT

## 2018-01-24 PROCEDURE — 87086 URINE CULTURE/COLONY COUNT: CPT

## 2018-01-24 RX ADMIN — FENTANYL CITRATE SCH MLS/HR: 50 INJECTION, SOLUTION INTRAMUSCULAR; INTRAVENOUS at 15:08

## 2018-01-24 RX ADMIN — FENTANYL CITRATE SCH MLS/HR: 50 INJECTION, SOLUTION INTRAMUSCULAR; INTRAVENOUS at 10:07

## 2018-01-24 RX ADMIN — OXYTOCIN SCH MLS/HR: 10 INJECTION, SOLUTION INTRAMUSCULAR; INTRAVENOUS at 10:07

## 2018-01-24 RX ADMIN — OXYTOCIN SCH MLS/HR: 10 INJECTION, SOLUTION INTRAMUSCULAR; INTRAVENOUS at 06:39

## 2018-01-24 NOTE — PD
HPI


Chief Complaint


LOF


Travel History


International Travel<30 Days:  No


Contact w/Intl Traveler<30Days:  No


Known Affected Area:  No





History of Present Illness


HPI


32-year-old  001, IUP at 37.1





Prenatal care at complicated by hyperemesis gravidarum, pyelonephritis





Patient presents complaining of a large gush of clear fluid at 3:30 this 

morning.  She reports that contractions started about 4 AM but remain irregular 

and mild in nature.  She reports she continues to leak.  She denies any vaginal 

bleeding.  She reports good fetal movement.  She denies regular, painful 

contractions.  She has no other complaints.


Weeks Gestation:  37


Para:  1


:  2





History


Past Medical History


*** Narrative Medical


Hyperemesis gravidarum


Pyelonephritis





Obstetric History


Obstetric History


,


 1


Hyperemesis gravidarum 2





Past Surgical History


Surgical History:  No Previous Surgery





Family History


Family History:  Negative





Social History


Alcohol Use:  No


Tobacco Use:  No


Substance Abuse:  No





Allergies-Medications


(Allergen,Severity, Reaction):  


Coded Allergies:  


     penicillin G (Unverified  Adverse Reaction, Severe, rash, 8/15/17)


Uncoded Allergies:  


     CILLINS (Allergy, Severe, 10/1/15)


Home Meds


Active Scripts


Cephalexin (Cephalexin) 500 Mg Cap, 500 MG PO BID for uti for 7 Days, #14 CAP


   Prov:Rachel Armas MD         17


Metoclopramide (Reglan) 10 Mg Tab, 10 MG PO TIDAC for Reflux, #60 TAB 2 Refills


   Prov:Nory Posadas MD         17


Ranitidine (Zantac) 150 Mg Tab, 150 MG PO BID for Reduce Stomach Acid, #60 TAB 

2 Refills


   Prov:Nory Posadas MD         17


Promethazine (Phenergan) 25 Mg Tablet, 25 MG PO Q8H for Nausea, #90 TAB 2 

Refills


   Prov:Nory Posadas MD         17


Ondansetron Odt (Zofran Odt) 4 Mg Tab, 4 MG SL Q8HR Y for Nausea/Vomiting, #15 

TAB


   May substitute non-ODT form.


   Prov:Enrique Caldera MD         17





Review of Systems


Except as stated in HPI:  all other systems reviewed are Neg





Physical Exam


Narrative


GENERAL: Well-nourished, well-developed patient.


SKIN: Warm and dry.


HEAD: Normocephalic and atraumatic.


EYES: No scleral icterus. No injection or drainage. 


ENT: No nasal drainage noted. Mucous membranes pink. Airway patent.


NECK: Supple, trachea midline. No JVD.


CARDIOVASCULAR: Regular rate and rhythm without murmurs, gallops, or rubs. 


RESPIRATORY: Breath sounds equal bilaterally. No accessory muscle use.


BREASTS: Deferred


ABDOMEN/GI: Abdomen soft, non-tender, bowel sounds present, no rebound, no 

guarding 


   Gravid 


GENITOURINARY: 


   External Genitalia: intact and normal in appearance.  Normal BUS.  No 

cervical or vaginal masses noted.  Grossly normal rugae.  Patient appears to be 

grossly ruptured with SVE 2/60/-2.  Amniosure positive.  Cephalic as per RN.


   Uterine Contractions: Irregular


FHT's: Fetal heart tones in the 140s with moderate long-term variability, good 

accelerations, no decelerations.  This is a category 1 fetal heart rate tracing 

and reactive NST


EXTREMITIES: No cyanosis or edema.


BACK: Nontender without obvious deformity. .


NEUROLOGICAL/musculoskeletal: Awake and alert. Motor and sensory grossly within 

normal limits.  Grossly normal muscle strength in all muscle groups. Normal 

speech.  Grossly normal range of motion, gait new line psychiatric: Grossly 

normal





Data


Data


Orders





 Orders


Ob (2e) Additional Admit Info (18 05:23)





MDM


Plan


Assessment/plan:


1.  IUP at 37.1


2.  PROM: Patient with grossly ruptured membranes.  Discussed in brief with the 

patient risks of  and risks/indications for  delivery if indicated 

for fetal or maternal indications.  Decreased discussed in brief augmentation/

induction of labor if indicated.  Will admit to Dr. Saucedo for further 

management.


3.  Pyelonephritis: Admitted for pyelonephritis on 17


4.  GBS negative


5.  Hyperemesis


6.  Fetal well-being: Reassuring  testing with category 1 fetal heart 

rate tracing reactive NST, continue Sherine Silva MD 2018 05:44

## 2018-01-24 NOTE — HHI.PR
OB/GYN Note


Note


doing well, mild to moderate contractions, not yet requesting epidural


SVE /-2, clear fluid, SROM'd around 330A today


pitocin running, continue augmentation


anticipate 











Nory Posadas MD 2018 08:17

## 2018-01-24 NOTE — PD.OB.DELI
Weeks gestation:  37


Gest age assessed date:  2018


Pt started active labor?:  Yes


Active labor start date:  2018


Active labor start time:  08:00


Medical induction of labor?:  No


Artificial rupture of membrane:  No


Anesthesia:  Epidural


Episiotomy:  None


Vaginal Delivery:  Normal


Presentation:  Occiput anterior


Nuchal Cord:  None


Delayed cord clamping (45 sec):  Yes


Infant:  Female


Delivery date:  2018


Delivery time:  15:47


One Minute APGAR:  9


Five Minute APGAR:  9


Birth Weight:  6


Placenta:  Spontaneous delivery


Laceration:  1 deg


Repair:  Chromic interrupted


Estimated blood loss:  300











Jemima Little MD 2018 15:47

## 2018-01-24 NOTE — HHI.DCPOC
Discharge Care Plan


Report Symptoms to Your Doctor


-Temperature above 100.5 degrees


-Redness, of incision or excessive or foul smelling drainage


-Unusual pain or calf pain


-Increased vaginal bleeding


-Painful or difficulty urinating


-Feelings of extreme sadness or anxiety after 2 weeks


Goals to Promote Your Health


* To prevent worsening of your condition and complications


* To maintain your health at the optimal level


Directions to Meet Your Goals


*** Take your medications as prescribed


*** Follow your dietary instruction


*** Follow activity as directed


*** Ensure plenty of rest for recovery


*** Drink fluids for hydration








*** Keep your appointments as scheduled


*** Take your immunizations and boosters as scheduled


*** If your symptoms worsen call your PCP, if no PCP go to Urgent Care Center 

or Emergency Room***


*** Smoking is Dangerous to Your Health. Avoid second hand smoke***


***Call the 24-hour crisis hotline for domestic abuse at 1-579.683.7274***











Jemima Little MD Jan 24, 2018 15:50

## 2018-01-25 VITALS
HEART RATE: 88 BPM | DIASTOLIC BLOOD PRESSURE: 83 MMHG | RESPIRATION RATE: 16 BRPM | OXYGEN SATURATION: 98 % | TEMPERATURE: 98.8 F | SYSTOLIC BLOOD PRESSURE: 128 MMHG

## 2018-01-25 VITALS
SYSTOLIC BLOOD PRESSURE: 114 MMHG | DIASTOLIC BLOOD PRESSURE: 73 MMHG | HEART RATE: 83 BPM | TEMPERATURE: 97.7 F | RESPIRATION RATE: 20 BRPM

## 2018-01-25 NOTE — HHI.OB
Subjective


Post Partum Day:  1


Remarks


s/p FTSVD of healthy  female





Objective


Vitals/I&O





Vital Signs








  Date Time  Temp Pulse Resp B/P (MAP) Pulse Ox O2 Delivery O2 Flow Rate FiO2


 


18 20:30 97.4 78 18 111/74 (86) 100   


 


18 19:55   18     


 


18 19:45  82  114/75 (88)    


 


18 19:30  91  124/83 (97)    


 


18 19:16  282  116/77 (90)    


 


18 19:00  80  122/77 (92)    


 


18 18:45  88  131/85 (100)    


 


18 18:30  79  117/68 (84)    


 


18 18:15  82  116/72 (87)    


 


18 18:01    112/73 (86)    


 


18 18:01  85      


 


18 17:45  81  118/78 (91)    


 


18 17:30  76  120/74 (89)    


 


18 17:25 98.2  18     


 


18 17:15  80  113/66 (82)    


 


18 17:00  82  116/77 (90)    


 


18 16:45  84  117/77 (90)    


 


18 16:31  93  114/80 (91)    


 


18 16:15  95  121/74 (90)    


 


18 16:02   18     


 


18 16:01  94  112/66 (81)    


 


18 16:00  95  119/67 (84)    


 


18 15:42  140  107/62 (77)    


 


18 15:30  112  110/66 (81)    


 


18 15:08   18     


 


18 15:00  98  111/73 (86)    


 


18 14:45 98.5  18     


 


18 14:30  92  107/64 (78)    


 


18 14:00  99  113/55 (74)    


 


18 13:30  93  113/68 (83)    


 


18 13:00  90  105/61 (76)    


 


18 12:30 98.7  18     


 


1/24/18 12:30  87  94/52 (66)    


 


18 12:00  108  113/64 (80)    


 


18 11:30 98.0  18     


 


18 11:30  88  98/63 (75)    


 


18 11:17  100  94/58 (70)    


 


18 11:15  82      


 


18 11:10  98  88/61 (70)    


 


18 11:10  94      


 


18 11:05  97      


 


18 11:00  94  92/50 (64)    


 


18 11:00  85      


 


18 10:55  108      


 


18 10:50  86      


 


18 10:45  92      


 


18 10:40  101      


 


18 10:35  103      


 


18 10:30  109      


 


18 10:30  103  101/53 (69)    


 


18 10:25  100      


 


18 10:20  97      


 


18 10:15  103      


 


18 10:10  87      


 


18 10:07   18     


 


18 10:05  87  122/68 (86)    


 


18 10:05  91      


 


18 10:00  96      


 


18 10:00  89  113/61 (78)    


 


18 09:56  126  116/62 (80)    


 


18 09:55  99      


 


18 09:50  85      


 


18 09:50  94  121/75 (90)    


 


18 09:48  88  123/68 (86)    


 


18 09:45  106  112/56 (74)    


 


18 09:45  92      


 


18 09:40  97      


 


18 09:40  96  111/64 (80)    


 


18 09:39 98.0  18     


 


18 09:36  92  114/67 (83)    


 


18 09:35  90      


 


18 09:34  89  118/65 (82)    


 


18 09:30  94      


 


18 09:25  94      


 


18 09:20  94      


 


18 09:00  84  111/66 (81)    


 


18 08:33  91  119/67 (84)    








Objective Remarks


GENERAL: Well-nourished, well-developed patient.


CARDIOVASCULAR: Regular rate and rhythm without murmurs, gallops, or rubs. 


RESPIRATORY: Breath sounds equal bilaterally. No accessory muscle use.


ABDOMEN/GI: Abdomen soft, non-tender. 


   Fundus: Firm, non-tender at umbilicus.


GENITOURINARY: Light bleeding.


EXTREMITIES: No cyanosis or edema, non-tender, without signs of DVT.


Medications and IVs





Current Medications








 Medications


  (Trade)  Dose


 Ordered  Sig/Leonel


 Route  Start Time


 Stop Time Status Last Admin


 


 Lactated Ringer's  1,000 ml @ 


 125 mls/hr  Q8H


 IV  18 05:45


    18 10:07


 


 


 Lactated Ringer's  1,000 ml @ 


 3,000 mls/hr  Q20M PRN


 IV  18 05:45


     


 


 


 Sodium Chloride  1,000 ml @ 


 100 mls/hr  Q10H PRN


 IV  18 06:05


     


 


 


  (Xylocaine 1%


 Inj (50 ml))  0.1 ml  UNSCH X1  PRN


 I-DERMAL  18 05:45


 18 05:44   


 


 


  (Bicitra Liq)  30 ml  ON  CALL


 PO  18 05:45


 18 05:44   


 


 


  (fentaNYL INJ)  50 mcg  Q1H  PRN


 IV PUSH  18 05:45


     


 


 


  (fentaNYL INJ)  100 mcg  Q1H  PRN


 IV PUSH  18 05:45


     


 


 


  (Xylocaine 1%


 Inj (50 ml))  10 ml  UNSCH X1  PRN


 INFIL  18 05:45


 18 05:44   


 


 


  (Muri-Lube Oil)  10 ml  UNSCH  PRN


 TOPICAL  18 05:45


     


 


 


  (Zofran Inj)  4 mg  Q6H  PRN


 IV  18 06:30


    18 06:40


 


 


 Oxytocin  500 ml @ 0


 mls/hr  TITRATE  PRN


 IV  18 06:30


     


 


 


 Miscellaneous


 Information  No systemic


 narcotics to be


 given except...  UNSCH  PRN


 .XX  18 10:00


 18 09:59   


 


 


 Miscellaneous


 Information  DO NOT


 ADMINISTER ANY


 ANTICOAGUL...  UNSCH  PRN


 .XX  18 10:00


 18 09:59   


 


 


 Fentanyl/


 Bupivacaine HCl  100 ml @ 0


 mls/hr  TITRATE


 EPIDURAL  18 10:00


    18 15:08


 


 


  (ePHEDrine/NS 25


 MG/5 ML SYR)  10 mg  UNSCH  PRN


 IV PUSH  18 10:00


 18 09:59  18 10:08


 


 


  (NS Flush)  2 ml  BID


 IV FLUSH  18 21:00


     


 


 


  (NS Flush)  2 ml  UNSCH  PRN


 IV FLUSH  18 16:00


     


 


 


  (Tylenol)  650 mg  Q4H  PRN


 PO  18 16:00


     


 


 


  (Motrin)  800 mg  Q8H  PRN


 PO  18 16:00


     


 


 


  (Americaine 20%


 Top Spr)  1 spray  Q4H  PRN


 TOPICAL  18 16:00


     


 


 


  (Tucks Pads)  1 applic  QID  PRN


 TOPICAL  18 16:00


     


 


 


  (Aida-Colace)  2 tab  Q12H  PRN


 PO  18 16:00


     


 


 


  (Ambien)  5 mg  HS  PRN


 PO  18 16:00


     


 


 


  (Mag-Al Plus


 Susp Liq)  15 ml  Q8H  PRN


 PO  18 16:00


     


 


 


  (Zofran  Odt)  4 mg  Q6H  PRN


 PO  18 16:00


     


 











Assessment/Plan


Problem List:  


(1)  (spontaneous vaginal delivery)


ICD Codes:  O80 - Encounter for full-term uncomplicated delivery


Status:  Acute


Assessment and Plan


PPD#1





routine supportive care


anticipate d/c tmrw 18


Discharge Planning


routine











Nory Posadas MD 2018 07:49

## 2018-01-26 VITALS
OXYGEN SATURATION: 99 % | HEART RATE: 76 BPM | DIASTOLIC BLOOD PRESSURE: 79 MMHG | RESPIRATION RATE: 16 BRPM | SYSTOLIC BLOOD PRESSURE: 113 MMHG | TEMPERATURE: 97.5 F

## 2018-01-26 NOTE — HHI.OB
Subjective


Post Partum Day:  2


Remarks


doing well, ready for discharge





Objective


Vitals/I&O





Vital Signs








  Date Time  Temp Pulse Resp B/P (MAP) Pulse Ox O2 Delivery O2 Flow Rate FiO2


 


18 20:30  88  128/83 (98)    


 


18 20:30 98.8  16  98   


 


18 08:30 97.7       


 


18 08:30  83 20 114/73 (87)    








Objective Remarks


GENERAL: Well-nourished, well-developed patient.


CARDIOVASCULAR: Regular rate and rhythm without murmurs, gallops, or rubs. 


RESPIRATORY: Breath sounds equal bilaterally. No accessory muscle use.


ABDOMEN/GI: Abdomen soft, non-tender. 


   Fundus: Firm, non-tender at umbilicus.


GENITOURINARY: Light bleeding.


EXTREMITIES: No cyanosis or edema, non-tender, without signs of DVT.


Medications and IVs





Current Medications








 Medications


  (Trade)  Dose


 Ordered  Sig/Leonel


 Route  Start Time


 Stop Time Status Last Admin


 


 Lactated Ringer's  1,000 ml @ 


 125 mls/hr  Q8H


 IV  18 05:45


    18 10:07


 


 


 Lactated Ringer's  1,000 ml @ 


 3,000 mls/hr  Q20M PRN


 IV  18 05:45


     


 


 


 Sodium Chloride  1,000 ml @ 


 100 mls/hr  Q10H PRN


 IV  18 06:05


     


 


 


  (Xylocaine 1%


 Inj (50 ml))  0.1 ml  UNSCH X1  PRN


 I-DERMAL  18 05:45


 18 05:44   


 


 


  (Bicitra Liq)  30 ml  ON  CALL


 PO  18 05:45


 18 05:44   


 


 


  (fentaNYL INJ)  50 mcg  Q1H  PRN


 IV PUSH  18 05:45


     


 


 


  (fentaNYL INJ)  100 mcg  Q1H  PRN


 IV PUSH  18 05:45


     


 


 


  (Muri-Lube Oil)  10 ml  UNSCH  PRN


 TOPICAL  18 05:45


     


 


 


  (Zofran Inj)  4 mg  Q6H  PRN


 IV  18 06:30


    18 06:40


 


 


 Oxytocin  500 ml @ 0


 mls/hr  TITRATE  PRN


 IV  18 06:30


     


 


 


 Fentanyl/


 Bupivacaine HCl  100 ml @ 0


 mls/hr  TITRATE


 EPIDURAL  18 10:00


    18 15:08


 


 


  (NS Flush)  2 ml  BID


 IV FLUSH  18 21:00


     


 


 


  (NS Flush)  2 ml  UNSCH  PRN


 IV FLUSH  18 16:00


     


 


 


  (Tylenol)  650 mg  Q4H  PRN


 PO  18 16:00


     


 


 


  (Motrin)  800 mg  Q8H  PRN


 PO  18 16:00


    18 09:26


 


 


  (Americaine 20%


 Top Spr)  1 spray  Q4H  PRN


 TOPICAL  18 16:00


     


 


 


  (Tucks Pads)  1 applic  QID  PRN


 TOPICAL  18 16:00


    18 04:20


 


 


  (Aida-Colace)  2 tab  Q12H  PRN


 PO  18 16:00


    18 09:26


 


 


  (Ambien)  5 mg  HS  PRN


 PO  18 16:00


     


 


 


  (Mag-Al Plus


 Susp Liq)  15 ml  Q8H  PRN


 PO  18 16:00


     


 


 


  (Zofran  Odt)  4 mg  Q6H  PRN


 PO  18 16:00


     


 











Assessment/Plan


Problem List:  


(1)  (spontaneous vaginal delivery)


ICD Codes:  O80 - Encounter for full-term uncomplicated delivery


Status:  Acute


Assessment and Plan


PPD#2





routine supportive care


anticipate d/c  18


Discharge Planning


routine


Attending Attestation


pt seen by Ilene aWlters MD 2018 08:15